# Patient Record
Sex: MALE | Race: WHITE | NOT HISPANIC OR LATINO | URBAN - METROPOLITAN AREA
[De-identification: names, ages, dates, MRNs, and addresses within clinical notes are randomized per-mention and may not be internally consistent; named-entity substitution may affect disease eponyms.]

---

## 2017-10-30 ENCOUNTER — INPATIENT (INPATIENT)
Facility: HOSPITAL | Age: 47
LOS: 0 days | Discharge: HOME | DRG: 247 | End: 2017-10-31
Attending: INTERNAL MEDICINE | Admitting: INTERNAL MEDICINE
Payer: COMMERCIAL

## 2017-10-30 ENCOUNTER — TRANSCRIPTION ENCOUNTER (OUTPATIENT)
Age: 47
End: 2017-10-30

## 2017-10-30 VITALS
TEMPERATURE: 98 F | RESPIRATION RATE: 16 BRPM | DIASTOLIC BLOOD PRESSURE: 85 MMHG | HEART RATE: 88 BPM | OXYGEN SATURATION: 98 % | SYSTOLIC BLOOD PRESSURE: 128 MMHG

## 2017-10-30 DIAGNOSIS — I20.9 ANGINA PECTORIS, UNSPECIFIED: ICD-10-CM

## 2017-10-30 LAB
ALBUMIN SERPL ELPH-MCNC: 4.7 G/DL — SIGNIFICANT CHANGE UP (ref 3.3–5)
ALP SERPL-CCNC: 31 U/L — LOW (ref 40–120)
ALT FLD-CCNC: 39 U/L RC — SIGNIFICANT CHANGE UP (ref 10–45)
ANION GAP SERPL CALC-SCNC: 13 MMOL/L — SIGNIFICANT CHANGE UP (ref 5–17)
APTT BLD: 37.9 SEC — HIGH (ref 27.5–37.4)
AST SERPL-CCNC: 30 U/L — SIGNIFICANT CHANGE UP (ref 10–40)
BASOPHILS # BLD AUTO: 0.1 K/UL — SIGNIFICANT CHANGE UP (ref 0–0.2)
BASOPHILS NFR BLD AUTO: 1.5 % — SIGNIFICANT CHANGE UP (ref 0–2)
BILIRUB SERPL-MCNC: 0.7 MG/DL — SIGNIFICANT CHANGE UP (ref 0.2–1.2)
BUN SERPL-MCNC: 16 MG/DL — SIGNIFICANT CHANGE UP (ref 7–23)
CALCIUM SERPL-MCNC: 10.1 MG/DL — SIGNIFICANT CHANGE UP (ref 8.4–10.5)
CHLORIDE SERPL-SCNC: 102 MMOL/L — SIGNIFICANT CHANGE UP (ref 96–108)
CK MB CFR SERPL CALC: 2.4 NG/ML — SIGNIFICANT CHANGE UP (ref 0–6.7)
CK SERPL-CCNC: 142 U/L — SIGNIFICANT CHANGE UP (ref 30–200)
CO2 SERPL-SCNC: 28 MMOL/L — SIGNIFICANT CHANGE UP (ref 22–31)
CREAT SERPL-MCNC: 1.09 MG/DL — SIGNIFICANT CHANGE UP (ref 0.5–1.3)
EOSINOPHIL # BLD AUTO: 0.2 K/UL — SIGNIFICANT CHANGE UP (ref 0–0.5)
EOSINOPHIL NFR BLD AUTO: 2.7 % — SIGNIFICANT CHANGE UP (ref 0–6)
GLUCOSE BLDC GLUCOMTR-MCNC: 161 MG/DL — HIGH (ref 70–99)
GLUCOSE SERPL-MCNC: 113 MG/DL — HIGH (ref 70–99)
HCT VFR BLD CALC: 48.9 % — SIGNIFICANT CHANGE UP (ref 39–50)
HGB BLD-MCNC: 15.4 G/DL — SIGNIFICANT CHANGE UP (ref 13–17)
INR BLD: 1.12 RATIO — SIGNIFICANT CHANGE UP (ref 0.88–1.16)
LYMPHOCYTES # BLD AUTO: 2.9 K/UL — SIGNIFICANT CHANGE UP (ref 1–3.3)
LYMPHOCYTES # BLD AUTO: 36.4 % — SIGNIFICANT CHANGE UP (ref 13–44)
MCHC RBC-ENTMCNC: 29.8 PG — SIGNIFICANT CHANGE UP (ref 27–34)
MCHC RBC-ENTMCNC: 31.5 GM/DL — LOW (ref 32–36)
MCV RBC AUTO: 94.9 FL — SIGNIFICANT CHANGE UP (ref 80–100)
MONOCYTES # BLD AUTO: 0.7 K/UL — SIGNIFICANT CHANGE UP (ref 0–0.9)
MONOCYTES NFR BLD AUTO: 9.2 % — SIGNIFICANT CHANGE UP (ref 2–14)
NEUTROPHILS # BLD AUTO: 4 K/UL — SIGNIFICANT CHANGE UP (ref 1.8–7.4)
NEUTROPHILS NFR BLD AUTO: 50.2 % — SIGNIFICANT CHANGE UP (ref 43–77)
PLATELET # BLD AUTO: 234 K/UL — SIGNIFICANT CHANGE UP (ref 150–400)
POTASSIUM SERPL-MCNC: 3.9 MMOL/L — SIGNIFICANT CHANGE UP (ref 3.5–5.3)
POTASSIUM SERPL-SCNC: 3.9 MMOL/L — SIGNIFICANT CHANGE UP (ref 3.5–5.3)
PROT SERPL-MCNC: 8.2 G/DL — SIGNIFICANT CHANGE UP (ref 6–8.3)
PROTHROM AB SERPL-ACNC: 12.2 SEC — SIGNIFICANT CHANGE UP (ref 9.8–12.7)
RBC # BLD: 5.15 M/UL — SIGNIFICANT CHANGE UP (ref 4.2–5.8)
RBC # FLD: 12.2 % — SIGNIFICANT CHANGE UP (ref 10.3–14.5)
SODIUM SERPL-SCNC: 143 MMOL/L — SIGNIFICANT CHANGE UP (ref 135–145)
TROPONIN T SERPL-MCNC: <0.01 NG/ML — SIGNIFICANT CHANGE UP (ref 0–0.06)
WBC # BLD: 8 K/UL — SIGNIFICANT CHANGE UP (ref 3.8–10.5)
WBC # FLD AUTO: 8 K/UL — SIGNIFICANT CHANGE UP (ref 3.8–10.5)

## 2017-10-30 PROCEDURE — 92928 PRQ TCAT PLMT NTRAC ST 1 LES: CPT | Mod: LD,GC

## 2017-10-30 PROCEDURE — 71020: CPT | Mod: 26

## 2017-10-30 PROCEDURE — 93010 ELECTROCARDIOGRAM REPORT: CPT | Mod: 59

## 2017-10-30 PROCEDURE — 99152 MOD SED SAME PHYS/QHP 5/>YRS: CPT | Mod: GC

## 2017-10-30 PROCEDURE — 99285 EMERGENCY DEPT VISIT HI MDM: CPT | Mod: 25

## 2017-10-30 PROCEDURE — 93010 ELECTROCARDIOGRAM REPORT: CPT

## 2017-10-30 PROCEDURE — 93458 L HRT ARTERY/VENTRICLE ANGIO: CPT | Mod: 26,59,GC

## 2017-10-30 RX ORDER — ASPIRIN/CALCIUM CARB/MAGNESIUM 324 MG
81 TABLET ORAL DAILY
Qty: 0 | Refills: 0 | Status: DISCONTINUED | OUTPATIENT
Start: 2017-10-30 | End: 2017-10-31

## 2017-10-30 RX ORDER — GLUCAGON INJECTION, SOLUTION 0.5 MG/.1ML
1 INJECTION, SOLUTION SUBCUTANEOUS ONCE
Qty: 0 | Refills: 0 | Status: DISCONTINUED | OUTPATIENT
Start: 2017-10-30 | End: 2017-10-31

## 2017-10-30 RX ORDER — DEXTROSE 50 % IN WATER 50 %
25 SYRINGE (ML) INTRAVENOUS ONCE
Qty: 0 | Refills: 0 | Status: DISCONTINUED | OUTPATIENT
Start: 2017-10-30 | End: 2017-10-31

## 2017-10-30 RX ORDER — ZALEPLON 10 MG
5 CAPSULE ORAL ONCE
Qty: 0 | Refills: 0 | Status: DISCONTINUED | OUTPATIENT
Start: 2017-10-30 | End: 2017-10-30

## 2017-10-30 RX ORDER — ATORVASTATIN CALCIUM 80 MG/1
20 TABLET, FILM COATED ORAL AT BEDTIME
Qty: 0 | Refills: 0 | Status: DISCONTINUED | OUTPATIENT
Start: 2017-10-30 | End: 2017-10-31

## 2017-10-30 RX ORDER — DEXTROSE 50 % IN WATER 50 %
12.5 SYRINGE (ML) INTRAVENOUS ONCE
Qty: 0 | Refills: 0 | Status: DISCONTINUED | OUTPATIENT
Start: 2017-10-30 | End: 2017-10-31

## 2017-10-30 RX ORDER — FENOFIBRATE,MICRONIZED 130 MG
145 CAPSULE ORAL DAILY
Qty: 0 | Refills: 0 | Status: DISCONTINUED | OUTPATIENT
Start: 2017-10-30 | End: 2017-10-31

## 2017-10-30 RX ORDER — DEXTROSE 50 % IN WATER 50 %
1 SYRINGE (ML) INTRAVENOUS ONCE
Qty: 0 | Refills: 0 | Status: DISCONTINUED | OUTPATIENT
Start: 2017-10-30 | End: 2017-10-31

## 2017-10-30 RX ORDER — ATORVASTATIN CALCIUM 80 MG/1
1 TABLET, FILM COATED ORAL
Qty: 30 | Refills: 2 | OUTPATIENT
Start: 2017-10-30 | End: 2018-01-27

## 2017-10-30 RX ORDER — SODIUM CHLORIDE 9 MG/ML
1000 INJECTION, SOLUTION INTRAVENOUS
Qty: 0 | Refills: 0 | Status: DISCONTINUED | OUTPATIENT
Start: 2017-10-30 | End: 2017-10-31

## 2017-10-30 RX ORDER — ASPIRIN/CALCIUM CARB/MAGNESIUM 324 MG
1 TABLET ORAL
Qty: 0 | Refills: 0 | COMMUNITY

## 2017-10-30 RX ORDER — CLOPIDOGREL BISULFATE 75 MG/1
75 TABLET, FILM COATED ORAL DAILY
Qty: 0 | Refills: 0 | Status: DISCONTINUED | OUTPATIENT
Start: 2017-10-31 | End: 2017-10-31

## 2017-10-30 RX ORDER — METFORMIN HYDROCHLORIDE 850 MG/1
1 TABLET ORAL
Qty: 0 | Refills: 0 | COMMUNITY

## 2017-10-30 RX ORDER — INSULIN LISPRO 100/ML
VIAL (ML) SUBCUTANEOUS
Qty: 0 | Refills: 0 | Status: DISCONTINUED | OUTPATIENT
Start: 2017-10-30 | End: 2017-10-31

## 2017-10-30 RX ORDER — INSULIN LISPRO 100/ML
VIAL (ML) SUBCUTANEOUS AT BEDTIME
Qty: 0 | Refills: 0 | Status: DISCONTINUED | OUTPATIENT
Start: 2017-10-30 | End: 2017-10-31

## 2017-10-30 RX ORDER — SODIUM CHLORIDE 9 MG/ML
3 INJECTION INTRAMUSCULAR; INTRAVENOUS; SUBCUTANEOUS ONCE
Qty: 0 | Refills: 0 | Status: COMPLETED | OUTPATIENT
Start: 2017-10-30 | End: 2017-10-30

## 2017-10-30 RX ADMIN — SODIUM CHLORIDE 3 MILLILITER(S): 9 INJECTION INTRAMUSCULAR; INTRAVENOUS; SUBCUTANEOUS at 17:42

## 2017-10-30 RX ADMIN — Medication 5 MILLIGRAM(S): at 23:40

## 2017-10-30 NOTE — DISCHARGE NOTE ADULT - PATIENT PORTAL LINK FT
“You can access the [facility] Patient Portal, offered by [facility], by registering with the following website: [email or web address]”

## 2017-10-30 NOTE — DISCHARGE NOTE ADULT - MEDICATION SUMMARY - MEDICATIONS TO TAKE
I will START or STAY ON the medications listed below when I get home from the hospital:    magnesium  -- 1 tab(s) by mouth once a day  -- Indication: For Supplement     Aspir 81 oral delayed release tablet  -- 1 tab(s) by mouth once a day  -- Indication: For To keep stent patent     metFORMIN 500 mg oral tablet  -- 1 tab(s) by mouth 2 times a day. RESTART ON 11/02/2017   -- Indication: For diabetes mellitus     atorvastatin 20 mg oral tablet  -- 1 tab(s) by mouth once a day  -- Indication: For hyperlipidemia     fenofibrate 160 mg oral tablet  -- 1 tab(s) by mouth once a day  -- Indication: For hyperlipidemia     clopidogrel 75 mg oral tablet  -- 1 tab(s) by mouth once a day  -- Indication: For To keep stent patent     Zinc  -- 1 tab(s) by mouth once a day  -- Indication: For supplement     Fish Oil 1200 mg oral capsule  -- 1 cap(s) by mouth once a day  -- Indication: For supplement     Multiple Vitamins oral tablet  -- 1 tab(s) by mouth once a day  -- Indication: For supplement     Vitamin D3  -- 3000 international unit(s) by mouth once a day  -- Indication: For supplement

## 2017-10-30 NOTE — DISCHARGE NOTE ADULT - CARE PLAN
Principal Discharge DX:	CAD (coronary artery disease)  Goal:	Pt remains chest pain free and understands post cath discharge instructions  Instructions for follow-up, activity and diet:	No heavy lifting or pushing/pulling with procedure arm for 2 weeks. No driving for 2 days. You may shower 24 hours following the procedure but avoid baths/swimming for 1 week. Check your wrist site for bleeding and/or swelling daily following procedure and call your doctor immediately if it occurs or if you experience increased pain at the site. Follow up with your cardiologist in 1-2 weeks. You may call [facility] if you have any questions/concerns regarding your procedure [phone].  Secondary Diagnosis:	Hyperlipidemia  Goal:	Your LDL cholesterol will be less than 70mg/dL  Instructions for follow-up, activity and diet:	Continue with your cholesterol medications. Eat a heart healthy diet that is low in saturated fats and salt, and includes whole grains, fruits, vegetables and lean protein; exercise regularly (consult with your physician or cardiologist first); maintain a heart healthy weight. Continue to follow with your primary physician or cardiologist for treatment goals, continue medication, have liver function testing every 3 months as anti lipid medications can cause liver irritation. If you smoke - quit (A resource to help you stop smoking is the [facility] – phone number [phone].).

## 2017-10-30 NOTE — ED ADULT NURSE NOTE - CHPI ED SYMPTOMS NEG
no chills/no fever/no vomiting/no nausea/no dizziness/no diaphoresis/no chest pain/no cough/no back pain/no syncope

## 2017-10-30 NOTE — CHART NOTE - NSCHARTNOTEFT_GEN_A_CORE
Removal of Radial Band  Pulses in the right upper extremity are palpable. The patient was placed in the supine position. The insertion site was identified and the band deflated per protocol. The radial band was removed slowly. Direct pressure was applied for 5  minutes.  VSS. No hematoma, bleeding or numbness and tingling.

## 2017-10-30 NOTE — ED PROVIDER NOTE - MEDICAL DECISION MAKING DETAILS
47 year old male with burning chest pain and abnormal nuclear stress test today. Sent to hospital for cardiac cath. Plan: admit.

## 2017-10-30 NOTE — DISCHARGE NOTE ADULT - CARE PROVIDER_API CALL
Francisco Silveira), Cardiovascular Disease; Internal Medicine  [address] [place] [address]  Phone: [phone]  Fax: [phone]

## 2017-10-30 NOTE — ED PROVIDER NOTE - OBJECTIVE STATEMENT
47 year old male with pmhx of type 2 diabetes presents with chest pain with exertion radiating to b/l UE and is sent to ER by cardiologist for cardiac cath. 5 weeks ago patient had an UTI, viral. Patient has been trying to vigorously work out and stopped working out for about 2 weeks because he had trouble breathing. Cardiologist concerned for pericarditis. Went to urgent care and had EKG done saw MD depressions. Had echo done and abnormal nuclear stress test done today. Non smoker. Fhx of cardiac issues.  Cardiologist- Dr. Francisco Silveira

## 2017-10-30 NOTE — DISCHARGE NOTE ADULT - HOSPITAL COURSE
47 year old male with pmhx of type 2 diabetes presents with chest pain with exertion radiating to b/l UE and is sent to ER by cardiologist for cardiac cath. 5 weeks ago patient had an UTI, viral. Patient has been trying to vigorously work out and stopped working out for about 2 weeks because he had trouble breathing. Cardiologist concerned for pericarditis. Went to urgent care and had EKG done saw AR depressions. Had echo done and abnormal nuclear stress test done today. Pt is now s/p cardiac cath GRISELDA x 1 pLAD. Pt tolerated the procedure well. Cardiac cath site benign. Discharge pending

## 2017-10-30 NOTE — DISCHARGE NOTE ADULT - PLAN OF CARE
Pt remains chest pain free and understands post cath discharge instructions No heavy lifting or pushing/pulling with procedure arm for 2 weeks. No driving for 2 days. You may shower 24 hours following the procedure but avoid baths/swimming for 1 week. Check your wrist site for bleeding and/or swelling daily following procedure and call your doctor immediately if it occurs or if you experience increased pain at the site. Follow up with your cardiologist in 1-2 weeks. You may call [facility] if you have any questions/concerns regarding your procedure [phone]. Your LDL cholesterol will be less than 70mg/dL Continue with your cholesterol medications. Eat a heart healthy diet that is low in saturated fats and salt, and includes whole grains, fruits, vegetables and lean protein; exercise regularly (consult with your physician or cardiologist first); maintain a heart healthy weight. Continue to follow with your primary physician or cardiologist for treatment goals, continue medication, have liver function testing every 3 months as anti lipid medications can cause liver irritation. If you smoke - quit (A resource to help you stop smoking is the [facility] – phone number [phone].).

## 2017-10-30 NOTE — DISCHARGE NOTE ADULT - ADDITIONAL INSTRUCTIONS
Follow-up with your Cardiologist in 1-2 week. DO NOT STOP TAKING ASPIRIN AND PLAVIX BEFORE SPEAKING WITH YOUR CARDIOLOGIST.

## 2017-10-30 NOTE — ED ADULT NURSE NOTE - OBJECTIVE STATEMENT
46 yo M with pmhx of DM2 and hyperlipidemia sent from cardiologist s/p positive stress test. C/o increasing ABEBE over the past week. No current CP/SOB. VSS. NSR on cardiac monitor. 18g IV in Left arm.   Lungs cta, Abd soft, nt, nd. Afebrile.   Denies fevers/chills.

## 2017-10-31 VITALS
OXYGEN SATURATION: 98 % | HEART RATE: 68 BPM | RESPIRATION RATE: 18 BRPM | SYSTOLIC BLOOD PRESSURE: 134 MMHG | DIASTOLIC BLOOD PRESSURE: 72 MMHG

## 2017-10-31 LAB
ANION GAP SERPL CALC-SCNC: 14 MMOL/L — SIGNIFICANT CHANGE UP (ref 5–17)
BUN SERPL-MCNC: 17 MG/DL — SIGNIFICANT CHANGE UP (ref 7–23)
CALCIUM SERPL-MCNC: 9.3 MG/DL — SIGNIFICANT CHANGE UP (ref 8.4–10.5)
CHLORIDE SERPL-SCNC: 102 MMOL/L — SIGNIFICANT CHANGE UP (ref 96–108)
CO2 SERPL-SCNC: 24 MMOL/L — SIGNIFICANT CHANGE UP (ref 22–31)
CREAT SERPL-MCNC: 1.06 MG/DL — SIGNIFICANT CHANGE UP (ref 0.5–1.3)
GLUCOSE SERPL-MCNC: 174 MG/DL — HIGH (ref 70–99)
HBA1C BLD-MCNC: 6.6 % — HIGH (ref 4–5.6)
HCT VFR BLD CALC: 40.5 % — SIGNIFICANT CHANGE UP (ref 39–50)
HGB BLD-MCNC: 14.1 G/DL — SIGNIFICANT CHANGE UP (ref 13–17)
MCHC RBC-ENTMCNC: 32.7 PG — SIGNIFICANT CHANGE UP (ref 27–34)
MCHC RBC-ENTMCNC: 34.9 GM/DL — SIGNIFICANT CHANGE UP (ref 32–36)
MCV RBC AUTO: 93.6 FL — SIGNIFICANT CHANGE UP (ref 80–100)
PLATELET # BLD AUTO: 209 K/UL — SIGNIFICANT CHANGE UP (ref 150–400)
POTASSIUM SERPL-MCNC: 3.8 MMOL/L — SIGNIFICANT CHANGE UP (ref 3.5–5.3)
POTASSIUM SERPL-SCNC: 3.8 MMOL/L — SIGNIFICANT CHANGE UP (ref 3.5–5.3)
RBC # BLD: 4.33 M/UL — SIGNIFICANT CHANGE UP (ref 4.2–5.8)
RBC # FLD: 12.1 % — SIGNIFICANT CHANGE UP (ref 10.3–14.5)
SODIUM SERPL-SCNC: 140 MMOL/L — SIGNIFICANT CHANGE UP (ref 135–145)
WBC # BLD: 7.8 K/UL — SIGNIFICANT CHANGE UP (ref 3.8–10.5)
WBC # FLD AUTO: 7.8 K/UL — SIGNIFICANT CHANGE UP (ref 3.8–10.5)

## 2017-10-31 PROCEDURE — 85027 COMPLETE CBC AUTOMATED: CPT

## 2017-10-31 PROCEDURE — 84484 ASSAY OF TROPONIN QUANT: CPT

## 2017-10-31 PROCEDURE — 80048 BASIC METABOLIC PNL TOTAL CA: CPT

## 2017-10-31 PROCEDURE — C1874: CPT

## 2017-10-31 PROCEDURE — C1887: CPT

## 2017-10-31 PROCEDURE — 80053 COMPREHEN METABOLIC PANEL: CPT

## 2017-10-31 PROCEDURE — 93010 ELECTROCARDIOGRAM REPORT: CPT

## 2017-10-31 PROCEDURE — 93005 ELECTROCARDIOGRAM TRACING: CPT

## 2017-10-31 PROCEDURE — C1894: CPT

## 2017-10-31 PROCEDURE — 85730 THROMBOPLASTIN TIME PARTIAL: CPT

## 2017-10-31 PROCEDURE — 71046 X-RAY EXAM CHEST 2 VIEWS: CPT

## 2017-10-31 PROCEDURE — 83036 HEMOGLOBIN GLYCOSYLATED A1C: CPT

## 2017-10-31 PROCEDURE — 82962 GLUCOSE BLOOD TEST: CPT

## 2017-10-31 PROCEDURE — 82553 CREATINE MB FRACTION: CPT

## 2017-10-31 PROCEDURE — 85610 PROTHROMBIN TIME: CPT

## 2017-10-31 PROCEDURE — 99153 MOD SED SAME PHYS/QHP EA: CPT

## 2017-10-31 PROCEDURE — C1725: CPT

## 2017-10-31 PROCEDURE — 99285 EMERGENCY DEPT VISIT HI MDM: CPT | Mod: 25

## 2017-10-31 PROCEDURE — C9600: CPT | Mod: LD

## 2017-10-31 PROCEDURE — 93458 L HRT ARTERY/VENTRICLE ANGIO: CPT | Mod: 59

## 2017-10-31 PROCEDURE — C1769: CPT

## 2017-10-31 PROCEDURE — 99152 MOD SED SAME PHYS/QHP 5/>YRS: CPT

## 2017-10-31 PROCEDURE — 82550 ASSAY OF CK (CPK): CPT

## 2017-10-31 RX ADMIN — Medication 145 MILLIGRAM(S): at 05:26

## 2017-10-31 RX ADMIN — CLOPIDOGREL BISULFATE 75 MILLIGRAM(S): 75 TABLET, FILM COATED ORAL at 05:26

## 2017-10-31 RX ADMIN — Medication 81 MILLIGRAM(S): at 06:31

## 2017-10-31 NOTE — PROGRESS NOTE ADULT - SUBJECTIVE AND OBJECTIVE BOX
47y old  Male who presents with a chief complaint of Chest pain (30 Oct 2017 21:02) now s/p cardiac cath GRISELDA x 1 pLAD via right radial.       Allergies    No Known Allergies    Intolerances      Medications:  aspirin enteric coated 81 milliGRAM(s) Oral daily  atorvastatin 20 milliGRAM(s) Oral at bedtime  clopidogrel Tablet 75 milliGRAM(s) Oral daily  dextrose 5%. 1000 milliLiter(s) IV Continuous <Continuous>  dextrose 50% Injectable 12.5 Gram(s) IV Push once  dextrose 50% Injectable 25 Gram(s) IV Push once  dextrose 50% Injectable 25 Gram(s) IV Push once  dextrose Gel 1 Dose(s) Oral once PRN  fenofibrate Tablet 145 milliGRAM(s) Oral daily  glucagon  Injectable 1 milliGRAM(s) IntraMuscular once PRN  insulin lispro (HumaLOG) corrective regimen sliding scale   SubCutaneous three times a day before meals  insulin lispro (HumaLOG) corrective regimen sliding scale   SubCutaneous at bedtime      Vitals:  T(C): 36.5 (10-30-17 @ 23:50), Max: 36.9 (10-30-17 @ 21:50)  HR: 70 (10-31-17 @ 01:50) (65 - 88)  BP: 138/78 (10-31-17 @ 01:50) (112/93 - 145/73)  BP(mean): --  RR: 16 (10-31-17 @ 01:50) (16 - 18)  SpO2: 97% (10-31-17 @ 01:50) (95% - 98%)  Wt(kg): --  Daily     Daily   I&O's Summary    30 Oct 2017 07:01  -  31 Oct 2017 02:29  --------------------------------------------------------  IN: 180 mL / OUT: 400 mL / NET: -220 mL      Physical Exam:  Appearance: Normal  HENT: Normal oral mus[place]a, NC/AT  Cardiovascular: S1S2, RRR, No M/R/G, no JVD, No Lower extremity edema  Procedural Access Site: Right radial. No hematoma, Non-tender to palpation, 2+ pulse, No bruit, No Ecchymosis  Respiratory: Clear to auscultation bilaterally  Gastrointestinal: Soft, Non tender, Normal Bowel Sounds  Musculoskeletal: No clubbing, No joint deformity   Neurologic: Non-focal  Psychiatry: AAOx3, Mood & affect appropriate  Skin: No rashes, No ecchymoses, No cyanosis    10-31    140  |  102  |  17  ----------------------------<  174<H>  3.8   |  24  |  1.06    Ca    9.3      31 Oct 2017 00:09    TPro  8.2  /  Alb  4.7  /  TBili  0.7  /  DBili  x   /  AST  30  /  ALT  39  /  AlkPhos  31<L>  10-30    PT/INR - ( 30 Oct 2017 17:46 )   PT: 12.2 sec;   INR: 1.12 ratio         PTT - ( 30 Oct 2017 17:46 )  PTT:37.9 sec  CARDIAC MARKERS ( 30 Oct 2017 17:46 )  x     / <0.01 ng/mL / 142 U/L / x     / 2.4 ng/mL    ECG: NSR 75bpm.     Stress Testing:     Cath: cardiac cath GRISELDA x 1 pLAD via right radial.     Interpretation of Telemetry:    ASSESSMENT/PLAN   47y old  Male who presents with a chief complaint of Chest pain (30 Oct 2017 21:02) now s/p cardiac cath GRISELDA x 1 pLAD via right radial. Pt tolerated the procedure well. Cardiac cath site benign. Overnight uneventful. EKG and blood work reviewed. Post-procedure discharge instructions discussed and questions addressed.

## 2020-12-05 ENCOUNTER — INPATIENT (INPATIENT)
Facility: HOSPITAL | Age: 50
LOS: 8 days | Discharge: HOME | DRG: 871 | End: 2020-12-14
Attending: INTERNAL MEDICINE | Admitting: INTERNAL MEDICINE
Payer: COMMERCIAL

## 2020-12-05 VITALS
RESPIRATION RATE: 20 BRPM | DIASTOLIC BLOOD PRESSURE: 74 MMHG | SYSTOLIC BLOOD PRESSURE: 114 MMHG | TEMPERATURE: 99 F | HEART RATE: 100 BPM | HEIGHT: 71 IN | WEIGHT: 257.94 LBS | OXYGEN SATURATION: 88 %

## 2020-12-05 DIAGNOSIS — U07.1 COVID-19: ICD-10-CM

## 2020-12-05 LAB
ALBUMIN SERPL ELPH-MCNC: 3.5 G/DL — SIGNIFICANT CHANGE UP (ref 3.3–5)
ALBUMIN SERPL ELPH-MCNC: 3.7 G/DL — SIGNIFICANT CHANGE UP (ref 3.3–5)
ALP SERPL-CCNC: 43 U/L — SIGNIFICANT CHANGE UP (ref 40–120)
ALP SERPL-CCNC: 57 U/L — SIGNIFICANT CHANGE UP (ref 40–120)
ALT FLD-CCNC: 42 U/L — SIGNIFICANT CHANGE UP (ref 10–45)
ALT FLD-CCNC: 55 U/L — HIGH (ref 10–45)
ANION GAP SERPL CALC-SCNC: 14 MMOL/L — SIGNIFICANT CHANGE UP (ref 5–17)
ANION GAP SERPL CALC-SCNC: 15 MMOL/L — SIGNIFICANT CHANGE UP (ref 5–17)
AST SERPL-CCNC: 43 U/L — HIGH (ref 10–40)
AST SERPL-CCNC: 79 U/L — HIGH (ref 10–40)
BASE EXCESS BLDV CALC-SCNC: -0.7 MMOL/L — SIGNIFICANT CHANGE UP (ref -2–2)
BASE EXCESS BLDV CALC-SCNC: 1.5 MMOL/L — SIGNIFICANT CHANGE UP (ref -2–2)
BASOPHILS # BLD AUTO: 0.05 K/UL — SIGNIFICANT CHANGE UP (ref 0–0.2)
BASOPHILS NFR BLD AUTO: 0.5 % — SIGNIFICANT CHANGE UP (ref 0–2)
BILIRUB SERPL-MCNC: 0.5 MG/DL — SIGNIFICANT CHANGE UP (ref 0.2–1.2)
BILIRUB SERPL-MCNC: 0.5 MG/DL — SIGNIFICANT CHANGE UP (ref 0.2–1.2)
BUN SERPL-MCNC: 14 MG/DL — SIGNIFICANT CHANGE UP (ref 7–23)
BUN SERPL-MCNC: 14 MG/DL — SIGNIFICANT CHANGE UP (ref 7–23)
CA-I SERPL-SCNC: 1.12 MMOL/L — SIGNIFICANT CHANGE UP (ref 1.12–1.3)
CA-I SERPL-SCNC: 1.16 MMOL/L — SIGNIFICANT CHANGE UP (ref 1.12–1.3)
CALCIUM SERPL-MCNC: 10.1 MG/DL — SIGNIFICANT CHANGE UP (ref 8.4–10.5)
CALCIUM SERPL-MCNC: 9.4 MG/DL — SIGNIFICANT CHANGE UP (ref 8.4–10.5)
CHLORIDE BLDV-SCNC: 102 MMOL/L — SIGNIFICANT CHANGE UP (ref 96–108)
CHLORIDE BLDV-SCNC: 104 MMOL/L — SIGNIFICANT CHANGE UP (ref 96–108)
CHLORIDE SERPL-SCNC: 98 MMOL/L — SIGNIFICANT CHANGE UP (ref 96–108)
CHLORIDE SERPL-SCNC: 98 MMOL/L — SIGNIFICANT CHANGE UP (ref 96–108)
CO2 BLDV-SCNC: 25 MMOL/L — SIGNIFICANT CHANGE UP (ref 22–30)
CO2 BLDV-SCNC: 28 MMOL/L — SIGNIFICANT CHANGE UP (ref 22–30)
CO2 SERPL-SCNC: 20 MMOL/L — LOW (ref 22–31)
CO2 SERPL-SCNC: 22 MMOL/L — SIGNIFICANT CHANGE UP (ref 22–31)
CREAT SERPL-MCNC: 0.96 MG/DL — SIGNIFICANT CHANGE UP (ref 0.5–1.3)
CREAT SERPL-MCNC: 0.97 MG/DL — SIGNIFICANT CHANGE UP (ref 0.5–1.3)
CRP SERPL-MCNC: 32.97 MG/DL — HIGH (ref 0–0.4)
EOSINOPHIL # BLD AUTO: 0.04 K/UL — SIGNIFICANT CHANGE UP (ref 0–0.5)
EOSINOPHIL NFR BLD AUTO: 0.4 % — SIGNIFICANT CHANGE UP (ref 0–6)
GAS PNL BLDV: 130 MMOL/L — LOW (ref 135–145)
GAS PNL BLDV: 131 MMOL/L — LOW (ref 135–145)
GAS PNL BLDV: SIGNIFICANT CHANGE UP
GLUCOSE BLDC GLUCOMTR-MCNC: 279 MG/DL — HIGH (ref 70–99)
GLUCOSE BLDV-MCNC: 224 MG/DL — HIGH (ref 70–99)
GLUCOSE BLDV-MCNC: 256 MG/DL — HIGH (ref 70–99)
GLUCOSE SERPL-MCNC: 223 MG/DL — HIGH (ref 70–99)
GLUCOSE SERPL-MCNC: 233 MG/DL — HIGH (ref 70–99)
HCO3 BLDV-SCNC: 24 MMOL/L — SIGNIFICANT CHANGE UP (ref 21–29)
HCO3 BLDV-SCNC: 27 MMOL/L — SIGNIFICANT CHANGE UP (ref 21–29)
HCT VFR BLD CALC: 44 % — SIGNIFICANT CHANGE UP (ref 39–50)
HCT VFR BLDA CALC: 44 % — SIGNIFICANT CHANGE UP (ref 39–50)
HCT VFR BLDA CALC: 47 % — SIGNIFICANT CHANGE UP (ref 39–50)
HGB BLD CALC-MCNC: 14.4 G/DL — SIGNIFICANT CHANGE UP (ref 13–17)
HGB BLD CALC-MCNC: 15.3 G/DL — SIGNIFICANT CHANGE UP (ref 13–17)
HGB BLD-MCNC: 15.1 G/DL — SIGNIFICANT CHANGE UP (ref 13–17)
IMM GRANULOCYTES NFR BLD AUTO: 1.2 % — SIGNIFICANT CHANGE UP (ref 0–1.5)
LACTATE BLDV-MCNC: 2.3 MMOL/L — HIGH (ref 0.7–2)
LACTATE BLDV-MCNC: 3.1 MMOL/L — HIGH (ref 0.7–2)
LYMPHOCYTES # BLD AUTO: 0.92 K/UL — LOW (ref 1–3.3)
LYMPHOCYTES # BLD AUTO: 8.5 % — LOW (ref 13–44)
MCHC RBC-ENTMCNC: 30.9 PG — SIGNIFICANT CHANGE UP (ref 27–34)
MCHC RBC-ENTMCNC: 34.3 GM/DL — SIGNIFICANT CHANGE UP (ref 32–36)
MCV RBC AUTO: 90 FL — SIGNIFICANT CHANGE UP (ref 80–100)
MONOCYTES # BLD AUTO: 0.61 K/UL — SIGNIFICANT CHANGE UP (ref 0–0.9)
MONOCYTES NFR BLD AUTO: 5.6 % — SIGNIFICANT CHANGE UP (ref 2–14)
NEUTROPHILS # BLD AUTO: 9.07 K/UL — HIGH (ref 1.8–7.4)
NEUTROPHILS NFR BLD AUTO: 83.8 % — HIGH (ref 43–77)
NRBC # BLD: 0 /100 WBCS — SIGNIFICANT CHANGE UP (ref 0–0)
PCO2 BLDV: 40 MMHG — SIGNIFICANT CHANGE UP (ref 35–50)
PCO2 BLDV: 46 MMHG — SIGNIFICANT CHANGE UP (ref 35–50)
PH BLDV: 7.38 — SIGNIFICANT CHANGE UP (ref 7.35–7.45)
PH BLDV: 7.39 — SIGNIFICANT CHANGE UP (ref 7.35–7.45)
PLATELET # BLD AUTO: 264 K/UL — SIGNIFICANT CHANGE UP (ref 150–400)
PO2 BLDV: 39 MMHG — SIGNIFICANT CHANGE UP (ref 25–45)
PO2 BLDV: 50 MMHG — HIGH (ref 25–45)
POTASSIUM BLDV-SCNC: 3.8 MMOL/L — SIGNIFICANT CHANGE UP (ref 3.5–5.3)
POTASSIUM BLDV-SCNC: 6.5 MMOL/L — CRITICAL HIGH (ref 3.5–5.3)
POTASSIUM SERPL-MCNC: 4.4 MMOL/L — SIGNIFICANT CHANGE UP (ref 3.5–5.3)
POTASSIUM SERPL-MCNC: 5.8 MMOL/L — HIGH (ref 3.5–5.3)
POTASSIUM SERPL-SCNC: 4.4 MMOL/L — SIGNIFICANT CHANGE UP (ref 3.5–5.3)
POTASSIUM SERPL-SCNC: 5.8 MMOL/L — HIGH (ref 3.5–5.3)
PROCALCITONIN SERPL-MCNC: 0.39 NG/ML — HIGH (ref 0.02–0.1)
PROT SERPL-MCNC: 6.9 G/DL — SIGNIFICANT CHANGE UP (ref 6–8.3)
PROT SERPL-MCNC: 8.4 G/DL — HIGH (ref 6–8.3)
RBC # BLD: 4.89 M/UL — SIGNIFICANT CHANGE UP (ref 4.2–5.8)
RBC # FLD: 11.9 % — SIGNIFICANT CHANGE UP (ref 10.3–14.5)
SAO2 % BLDV: 64 % — LOW (ref 67–88)
SAO2 % BLDV: 84 % — SIGNIFICANT CHANGE UP (ref 67–88)
SARS-COV-2 RNA SPEC QL NAA+PROBE: DETECTED
SODIUM SERPL-SCNC: 133 MMOL/L — LOW (ref 135–145)
SODIUM SERPL-SCNC: 134 MMOL/L — LOW (ref 135–145)
WBC # BLD: 10.82 K/UL — HIGH (ref 3.8–10.5)
WBC # FLD AUTO: 10.82 K/UL — HIGH (ref 3.8–10.5)

## 2020-12-05 PROCEDURE — 71045 X-RAY EXAM CHEST 1 VIEW: CPT | Mod: 26

## 2020-12-05 PROCEDURE — 99285 EMERGENCY DEPT VISIT HI MDM: CPT

## 2020-12-05 PROCEDURE — 93308 TTE F-UP OR LMTD: CPT | Mod: 26

## 2020-12-05 RX ORDER — DEXTROSE 50 % IN WATER 50 %
15 SYRINGE (ML) INTRAVENOUS ONCE
Refills: 0 | Status: DISCONTINUED | OUTPATIENT
Start: 2020-12-05 | End: 2020-12-14

## 2020-12-05 RX ORDER — SODIUM CHLORIDE 9 MG/ML
1000 INJECTION, SOLUTION INTRAVENOUS
Refills: 0 | Status: DISCONTINUED | OUTPATIENT
Start: 2020-12-05 | End: 2020-12-14

## 2020-12-05 RX ORDER — DEXAMETHASONE 0.5 MG/5ML
6 ELIXIR ORAL DAILY
Refills: 0 | Status: COMPLETED | OUTPATIENT
Start: 2020-12-05 | End: 2020-12-14

## 2020-12-05 RX ORDER — DEXAMETHASONE 0.5 MG/5ML
6 ELIXIR ORAL ONCE
Refills: 0 | Status: COMPLETED | OUTPATIENT
Start: 2020-12-05 | End: 2020-12-05

## 2020-12-05 RX ORDER — INSULIN LISPRO 100/ML
VIAL (ML) SUBCUTANEOUS AT BEDTIME
Refills: 0 | Status: DISCONTINUED | OUTPATIENT
Start: 2020-12-05 | End: 2020-12-14

## 2020-12-05 RX ORDER — CHOLECALCIFEROL (VITAMIN D3) 125 MCG
3000 CAPSULE ORAL
Qty: 0 | Refills: 0 | DISCHARGE

## 2020-12-05 RX ORDER — ASPIRIN/CALCIUM CARB/MAGNESIUM 324 MG
81 TABLET ORAL DAILY
Refills: 0 | Status: DISCONTINUED | OUTPATIENT
Start: 2020-12-05 | End: 2020-12-14

## 2020-12-05 RX ORDER — ONDANSETRON 8 MG/1
4 TABLET, FILM COATED ORAL ONCE
Refills: 0 | Status: COMPLETED | OUTPATIENT
Start: 2020-12-05 | End: 2020-12-05

## 2020-12-05 RX ORDER — SODIUM CHLORIDE 9 MG/ML
1000 INJECTION, SOLUTION INTRAVENOUS ONCE
Refills: 0 | Status: COMPLETED | OUTPATIENT
Start: 2020-12-05 | End: 2020-12-05

## 2020-12-05 RX ORDER — SODIUM CHLORIDE 9 MG/ML
1000 INJECTION INTRAMUSCULAR; INTRAVENOUS; SUBCUTANEOUS
Refills: 0 | Status: DISCONTINUED | OUTPATIENT
Start: 2020-12-05 | End: 2020-12-06

## 2020-12-05 RX ORDER — CLOPIDOGREL BISULFATE 75 MG/1
75 TABLET, FILM COATED ORAL DAILY
Refills: 0 | Status: DISCONTINUED | OUTPATIENT
Start: 2020-12-05 | End: 2020-12-14

## 2020-12-05 RX ORDER — ACETAMINOPHEN 500 MG
650 TABLET ORAL EVERY 4 HOURS
Refills: 0 | Status: DISCONTINUED | OUTPATIENT
Start: 2020-12-05 | End: 2020-12-14

## 2020-12-05 RX ORDER — GLUCAGON INJECTION, SOLUTION 0.5 MG/.1ML
1 INJECTION, SOLUTION SUBCUTANEOUS ONCE
Refills: 0 | Status: DISCONTINUED | OUTPATIENT
Start: 2020-12-05 | End: 2020-12-14

## 2020-12-05 RX ORDER — INSULIN LISPRO 100/ML
VIAL (ML) SUBCUTANEOUS
Refills: 0 | Status: DISCONTINUED | OUTPATIENT
Start: 2020-12-05 | End: 2020-12-06

## 2020-12-05 RX ORDER — REMDESIVIR 5 MG/ML
100 INJECTION INTRAVENOUS EVERY 24 HOURS
Refills: 0 | Status: COMPLETED | OUTPATIENT
Start: 2020-12-06 | End: 2020-12-09

## 2020-12-05 RX ORDER — REMDESIVIR 5 MG/ML
INJECTION INTRAVENOUS
Refills: 0 | Status: COMPLETED | OUTPATIENT
Start: 2020-12-05 | End: 2020-12-09

## 2020-12-05 RX ORDER — DEXTROSE 50 % IN WATER 50 %
12.5 SYRINGE (ML) INTRAVENOUS ONCE
Refills: 0 | Status: DISCONTINUED | OUTPATIENT
Start: 2020-12-05 | End: 2020-12-14

## 2020-12-05 RX ORDER — ENOXAPARIN SODIUM 100 MG/ML
40 INJECTION SUBCUTANEOUS EVERY 12 HOURS
Refills: 0 | Status: DISCONTINUED | OUTPATIENT
Start: 2020-12-05 | End: 2020-12-14

## 2020-12-05 RX ORDER — DEXTROSE 50 % IN WATER 50 %
25 SYRINGE (ML) INTRAVENOUS ONCE
Refills: 0 | Status: DISCONTINUED | OUTPATIENT
Start: 2020-12-05 | End: 2020-12-14

## 2020-12-05 RX ORDER — REMDESIVIR 5 MG/ML
200 INJECTION INTRAVENOUS EVERY 24 HOURS
Refills: 0 | Status: COMPLETED | OUTPATIENT
Start: 2020-12-05 | End: 2020-12-05

## 2020-12-05 RX ADMIN — Medication 81 MILLIGRAM(S): at 22:06

## 2020-12-05 RX ADMIN — Medication 6 MILLIGRAM(S): at 18:05

## 2020-12-05 RX ADMIN — ENOXAPARIN SODIUM 40 MILLIGRAM(S): 100 INJECTION SUBCUTANEOUS at 22:11

## 2020-12-05 RX ADMIN — CLOPIDOGREL BISULFATE 75 MILLIGRAM(S): 75 TABLET, FILM COATED ORAL at 22:07

## 2020-12-05 RX ADMIN — SODIUM CHLORIDE 1000 MILLILITER(S): 9 INJECTION, SOLUTION INTRAVENOUS at 18:41

## 2020-12-05 RX ADMIN — SODIUM CHLORIDE 1000 MILLILITER(S): 9 INJECTION, SOLUTION INTRAVENOUS at 19:36

## 2020-12-05 RX ADMIN — SODIUM CHLORIDE 100 MILLILITER(S): 9 INJECTION INTRAMUSCULAR; INTRAVENOUS; SUBCUTANEOUS at 22:05

## 2020-12-05 RX ADMIN — REMDESIVIR 500 MILLIGRAM(S): 5 INJECTION INTRAVENOUS at 22:06

## 2020-12-05 RX ADMIN — ONDANSETRON 4 MILLIGRAM(S): 8 TABLET, FILM COATED ORAL at 18:05

## 2020-12-05 NOTE — H&P ADULT - ASSESSMENT
49 yo M hx CAD s/p 1x stent, COVID+, c/o worsening SOB. Pt states he tested positive for COVID the day before Thanksgiving 10 days ago. This is also the time the pt's symptoms of myalgias, fatigue, cough, and fevers started, which have worsened over time and which is why he is here today. Pt has a pulse-oximeter at home. He notes his O2 sat to be typically 91-94%, and today was 85%. Pt took tylenol 2 houra menjivar.

## 2020-12-05 NOTE — ED PROVIDER NOTE - PROGRESS NOTE DETAILS
Attending Caroline Membreno: pt became more hypoxic. with oxygen to 70's. placed on NRB with imrpovement. will start hiflow

## 2020-12-05 NOTE — ED PROVIDER NOTE - ATTENDING CONTRIBUTION TO CARE
Attending MD Caroline Membreno:  I personally have seen and examined this patient.  Resident note reviewed and agree on plan of care and except where noted.  See HPI, PE, and MDM for details.

## 2020-12-05 NOTE — ED PROVIDER NOTE - DOMESTIC TRAVEL HIGH RISK QUESTION
We are committed to providing our patients with their test results in a timely manner. If you have access to [employer], you will receive your results within 5 to 7 days. If your results are normal, a member of our office may call you or you may receive a letter in the mail within 10 to 15 days of your testing. If your results have something additional to discuss, a member of our office will contact you by phone. If at any time you have questions related your results, please feel free to call our office at [phone]  
No

## 2020-12-05 NOTE — ED PROVIDER NOTE - OBJECTIVE STATEMENT
49 yo M hx CAD s/p 1x stent, COVID+, c/o worsening SOB. Pt states he tested positive for COVID the day before Thanksgiving 10 days ago. This is also the time the pt's symptoms of myalgias, fatigue, cough, and fevers started, which have worsened over time and which is why he is here today. Pt has a pulse-oximeter at home. He notes his O2 sat to be typically 91-94%, and today was 85%. Pt took tylenol 2 houra menjivar.     PCP: Dr Pritesh Silveira    MedHx: CAD, pre-diabetic, HLD  Meds: metformin, statin, plavix  NKDA

## 2020-12-05 NOTE — ED PROVIDER NOTE - PHYSICAL EXAMINATION
Gen: non-toxic appearing  Head: NCAT  Eyes: EOMI, PERRLA, no conjunctival pallor, no scleral icterus  ENT: mucous membranes moist, no discharge  Neck: neck supple  Resp: sating 94% on 4L NC, lungs CTAB  CV: RRR, +S1/S2, no M/R/G  GI: Abdomen soft non-distended, NTTP, no masses  MSK: No open wounds, no bruising, no lower extremity edema  Neuro: A&Ox4, sensation nl, motor 5/5 RUE/LUE/RLE/LLE, follows commands  Ext: no edema, no deformity, warm and well-perfused  Skin: no rash or bruising Gen: non-toxic appearing  Head: NCAT  Eyes: EOMI, PERRLA, no conjunctival pallor, no scleral icterus  ENT: mucous membranes moist, no discharge  Neck: neck supple  Resp: sating 94% on 4L NC, lungs CTAB  CV: RRR, +S1/S2, no M/R/G  GI: Abdomen soft non-distended, NTTP, no masses  MSK: No open wounds, no bruising, no lower extremity edema  Neuro: A&Ox4, sensation nl, motor 5/5 RUE/LUE/RLE/LLE, follows commands  Ext: no edema, no deformity, warm and well-perfused  Skin: no rash or bruising  Attending Caroline Membreno: Gen: NAD, heent: atrauamtic, eomi, perrla, mmm, op pink, uvula midline, neck; nttp, no nuchal rigidity, chest: nttp, no crepitus, cv: rrr, , lungs: ctab, abd: soft, nontender, nondistended, no peritoneal signs, +BS, no guarding, ext: wwp, neg homans, skin: no rash, neuro: awake and alert, following commands, speech clear, sensation and strength intact, no focal deficits

## 2020-12-05 NOTE — H&P ADULT - HISTORY OF PRESENT ILLNESS
51 yo M hx CAD s/p 1x stent, COVID+, c/o worsening SOB. Pt states he tested positive for COVID the day before Thanksgiving 10 days ago. This is also the time the pt's symptoms of myalgias, fatigue, cough, and fevers started, which have worsened over time and which is why he is here today. Pt has a pulse-oximeter at home. He notes his O2 sat to be typically 91-94%, and today was 85%. Pt took tylenol 2 houra menjivar.

## 2020-12-05 NOTE — ED ADULT NURSE NOTE - OBJECTIVE STATEMENT
49yo M aaox4 Hx cardiac stent (2017)ambulatory from home presents to ED c/o fever, sob and cough, pt states that he started feeling sick since 11/25/20, took covid and become positive, pt reports feeling sob, productive cough ( pinkish sputum), no taste and smell, body aches, today come for evaluation, pt presents w/ SPO2 80's placed in 4 Ltc\ O2, also c/o nausea., at this time Pt denies CP,, HA, vision changes, /v/d, , abdominal pain, Gu symptoms . No fever , pt reports took 2x500 mg  Tylenol PTA . Safety and comfort measures initiated- bed placed in lowest position and side rails raised. Pt oriented to call bell system.

## 2020-12-05 NOTE — ED PROVIDER NOTE - CLINICAL SUMMARY MEDICAL DECISION MAKING FREE TEXT BOX
Christine FUENTES PGY-1: 49 yo M COVID+, c/o worsening SOB and cough. Pt sating 84% on RA, improved to 94% on 4L NC. Will be admitted for hypoxemia. Labs, CXR. Will give decadron 6 mg per protocol. Christine FUENTES PGY-1: 49 yo M COVID+, c/o worsening SOB and cough. Pt sating 84% on RA, improved to 94% on 4L NC. Will be admitted for hypoxemia. Labs, CXR. Will give decadron 6 mg per protocol.  Attending Caroline Membreno: 51 y/o male diagonsed with covid around thanksgiving presenting with worsening sob and difficulty breathing. upon arrival pt hypoxic requiring supplemental oxygen. pocus shows diffuse anterior B lines. while in the ed pt became more hypoxic and placed on high flow with improvement. suspect symptoms likely from COVID infection. no limited pocus shows no visible DVT in b/l legs. limited RV views. will provide symptomatic treatment, decadron. pt tba.

## 2020-12-05 NOTE — ED PROVIDER NOTE - NS ED ROS FT
GENERAL: +fevers  EYES: No change in vision  HEENT: No trouble swallowing or speaking  CARDIAC: No chest pain  PULMONARY: +cough, +SOB  GI: +nausea, no abdominal pain, no diarrhea or constipation  : No changes in urination  SKIN: No rashes  NEURO: No headache, no numbness  MSK: No joint pain    Otherwise as HPI or negative.

## 2020-12-06 LAB
A1C WITH ESTIMATED AVERAGE GLUCOSE RESULT: 10.8 % — HIGH (ref 4–5.6)
ALBUMIN SERPL ELPH-MCNC: 3.6 G/DL — SIGNIFICANT CHANGE UP (ref 3.3–5)
ALP SERPL-CCNC: 46 U/L — SIGNIFICANT CHANGE UP (ref 40–120)
ALT FLD-CCNC: 44 U/L — SIGNIFICANT CHANGE UP (ref 10–45)
ANION GAP SERPL CALC-SCNC: 15 MMOL/L — SIGNIFICANT CHANGE UP (ref 5–17)
AST SERPL-CCNC: 38 U/L — SIGNIFICANT CHANGE UP (ref 10–40)
BILIRUB SERPL-MCNC: 0.4 MG/DL — SIGNIFICANT CHANGE UP (ref 0.2–1.2)
BUN SERPL-MCNC: 16 MG/DL — SIGNIFICANT CHANGE UP (ref 7–23)
CALCIUM SERPL-MCNC: 9.1 MG/DL — SIGNIFICANT CHANGE UP (ref 8.4–10.5)
CHLORIDE SERPL-SCNC: 99 MMOL/L — SIGNIFICANT CHANGE UP (ref 96–108)
CO2 SERPL-SCNC: 21 MMOL/L — LOW (ref 22–31)
CREAT SERPL-MCNC: 0.77 MG/DL — SIGNIFICANT CHANGE UP (ref 0.5–1.3)
D DIMER BLD IA.RAPID-MCNC: 202 NG/ML DDU — SIGNIFICANT CHANGE UP
ESTIMATED AVERAGE GLUCOSE: 263 MG/DL — HIGH (ref 68–114)
FERRITIN SERPL-MCNC: 890 NG/ML — HIGH (ref 30–400)
FERRITIN SERPL-MCNC: 898 NG/ML — HIGH (ref 30–400)
GLUCOSE BLDC GLUCOMTR-MCNC: 273 MG/DL — HIGH (ref 70–99)
GLUCOSE BLDC GLUCOMTR-MCNC: 282 MG/DL — HIGH (ref 70–99)
GLUCOSE BLDC GLUCOMTR-MCNC: 305 MG/DL — HIGH (ref 70–99)
GLUCOSE BLDC GLUCOMTR-MCNC: 312 MG/DL — HIGH (ref 70–99)
GLUCOSE SERPL-MCNC: 277 MG/DL — HIGH (ref 70–99)
HCT VFR BLD CALC: 38 % — LOW (ref 39–50)
HGB BLD-MCNC: 13.1 G/DL — SIGNIFICANT CHANGE UP (ref 13–17)
LDH SERPL L TO P-CCNC: 387 U/L — HIGH (ref 50–242)
MCHC RBC-ENTMCNC: 30.4 PG — SIGNIFICANT CHANGE UP (ref 27–34)
MCHC RBC-ENTMCNC: 34.5 GM/DL — SIGNIFICANT CHANGE UP (ref 32–36)
MCV RBC AUTO: 88.2 FL — SIGNIFICANT CHANGE UP (ref 80–100)
NRBC # BLD: 0 /100 WBCS — SIGNIFICANT CHANGE UP (ref 0–0)
PLATELET # BLD AUTO: 274 K/UL — SIGNIFICANT CHANGE UP (ref 150–400)
POTASSIUM SERPL-MCNC: 4.1 MMOL/L — SIGNIFICANT CHANGE UP (ref 3.5–5.3)
POTASSIUM SERPL-SCNC: 4.1 MMOL/L — SIGNIFICANT CHANGE UP (ref 3.5–5.3)
PROT SERPL-MCNC: 6.8 G/DL — SIGNIFICANT CHANGE UP (ref 6–8.3)
RBC # BLD: 4.31 M/UL — SIGNIFICANT CHANGE UP (ref 4.2–5.8)
RBC # FLD: 11.9 % — SIGNIFICANT CHANGE UP (ref 10.3–14.5)
SODIUM SERPL-SCNC: 135 MMOL/L — SIGNIFICANT CHANGE UP (ref 135–145)
WBC # BLD: 10.32 K/UL — SIGNIFICANT CHANGE UP (ref 3.8–10.5)
WBC # FLD AUTO: 10.32 K/UL — SIGNIFICANT CHANGE UP (ref 3.8–10.5)

## 2020-12-06 PROCEDURE — 99223 1ST HOSP IP/OBS HIGH 75: CPT

## 2020-12-06 RX ORDER — INSULIN GLARGINE 100 [IU]/ML
20 INJECTION, SOLUTION SUBCUTANEOUS AT BEDTIME
Refills: 0 | Status: DISCONTINUED | OUTPATIENT
Start: 2020-12-06 | End: 2020-12-07

## 2020-12-06 RX ORDER — INSULIN LISPRO 100/ML
VIAL (ML) SUBCUTANEOUS
Refills: 0 | Status: DISCONTINUED | OUTPATIENT
Start: 2020-12-06 | End: 2020-12-14

## 2020-12-06 RX ORDER — ALBUTEROL 90 UG/1
2 AEROSOL, METERED ORAL EVERY 6 HOURS
Refills: 0 | Status: DISCONTINUED | OUTPATIENT
Start: 2020-12-06 | End: 2020-12-14

## 2020-12-06 RX ORDER — SODIUM CHLORIDE 9 MG/ML
1000 INJECTION INTRAMUSCULAR; INTRAVENOUS; SUBCUTANEOUS
Refills: 0 | Status: DISCONTINUED | OUTPATIENT
Start: 2020-12-06 | End: 2020-12-07

## 2020-12-06 RX ORDER — INFLUENZA VIRUS VACCINE 15; 15; 15; 15 UG/.5ML; UG/.5ML; UG/.5ML; UG/.5ML
0.5 SUSPENSION INTRAMUSCULAR ONCE
Refills: 0 | Status: DISCONTINUED | OUTPATIENT
Start: 2020-12-06 | End: 2020-12-14

## 2020-12-06 RX ADMIN — Medication 6: at 11:47

## 2020-12-06 RX ADMIN — Medication 2: at 21:29

## 2020-12-06 RX ADMIN — ALBUTEROL 2 PUFF(S): 90 AEROSOL, METERED ORAL at 17:03

## 2020-12-06 RX ADMIN — Medication 6: at 08:14

## 2020-12-06 RX ADMIN — REMDESIVIR 500 MILLIGRAM(S): 5 INJECTION INTRAVENOUS at 21:37

## 2020-12-06 RX ADMIN — ALBUTEROL 2 PUFF(S): 90 AEROSOL, METERED ORAL at 23:10

## 2020-12-06 RX ADMIN — Medication 16: at 17:02

## 2020-12-06 RX ADMIN — SODIUM CHLORIDE 100 MILLILITER(S): 9 INJECTION INTRAMUSCULAR; INTRAVENOUS; SUBCUTANEOUS at 08:15

## 2020-12-06 RX ADMIN — ENOXAPARIN SODIUM 40 MILLIGRAM(S): 100 INJECTION SUBCUTANEOUS at 05:07

## 2020-12-06 RX ADMIN — Medication 100 MILLIGRAM(S): at 23:16

## 2020-12-06 RX ADMIN — Medication 81 MILLIGRAM(S): at 11:14

## 2020-12-06 RX ADMIN — ENOXAPARIN SODIUM 40 MILLIGRAM(S): 100 INJECTION SUBCUTANEOUS at 17:02

## 2020-12-06 RX ADMIN — Medication 1: at 00:26

## 2020-12-06 RX ADMIN — Medication 100 MILLIGRAM(S): at 13:00

## 2020-12-06 RX ADMIN — INSULIN GLARGINE 20 UNIT(S): 100 INJECTION, SOLUTION SUBCUTANEOUS at 21:36

## 2020-12-06 RX ADMIN — SODIUM CHLORIDE 50 MILLILITER(S): 9 INJECTION INTRAMUSCULAR; INTRAVENOUS; SUBCUTANEOUS at 12:59

## 2020-12-06 RX ADMIN — CLOPIDOGREL BISULFATE 75 MILLIGRAM(S): 75 TABLET, FILM COATED ORAL at 11:14

## 2020-12-06 RX ADMIN — Medication 6 MILLIGRAM(S): at 05:07

## 2020-12-06 NOTE — CONSULT NOTE ADULT - SUBJECTIVE AND OBJECTIVE BOX
Patient is a 50y old  Male who presents with a chief complaint of sob and covid positive (06 Dec 2020 10:36)      HPI:   49 yo M hx CAD s/p 1x stent, COVID+, c/o worsening SOB. Pt states he tested positive for COVID the day before Thanksgiving 10 days ago. This is also the time the pt's symptoms of myalgias, fatigue, cough, and fevers started, which have worsened over time and which is why he is here today. Pt has a pulse-oximeter at home. He notes his O2 sat to be typically 91-94%, and today was 85%. Pt took tylenol 2 houra menjivar.  (05 Dec 2020 19:44)    he has no underlying lung disease except OPSA:  he is not using any cpap at home: he is obese:     ?FOLLOWING PRESENT  [ x] Hx of PE/DVT, [x ] Hx COPD, [ x] Hx of Asthma, [x] Hx of Hospitalization, [ x  Hx of BiPAP/CPAP use, [x ] Hx of JUVENTINO    Allergies    No Known Allergies    Intolerances        PAST MEDICAL & SURGICAL HISTORY:  Type 2 diabetes mellitus without complication, without long-term current use of insulin    CAD (coronary artery disease)        FAMILY HISTORY:      Social History: [ x ] TOBACCO                  [ x ] ETOH                                 [  x] IVDA/DRUGS    REVIEW OF SYSTEMS      General:	x    Skin/Breast:x  	  Ophthalmologic:x  	  ENMT:	x    Respiratory and Thorax: cb, weakness fever  	  Cardiovascular:	x    Gastrointestinal:	x    Genitourinary:	x    Musculoskeletal:	x    Neurological:	x    Psychiatric:	x    Hematology/Lymphatics:	x    Endocrine:	xx    Allergic/Immunologic:	x    MEDICATIONS  (STANDING):  ALBUTerol    90 MICROgram(s) HFA Inhaler 2 Puff(s) Inhalation every 6 hours  aspirin enteric coated 81 milliGRAM(s) Oral daily  clopidogrel Tablet 75 milliGRAM(s) Oral daily  dexAMETHasone  Injectable 6 milliGRAM(s) IV Push daily  dextrose 40% Gel 15 Gram(s) Oral once  dextrose 5%. 1000 milliLiter(s) (50 mL/Hr) IV Continuous <Continuous>  dextrose 5%. 1000 milliLiter(s) (100 mL/Hr) IV Continuous <Continuous>  dextrose 50% Injectable 25 Gram(s) IV Push once  dextrose 50% Injectable 12.5 Gram(s) IV Push once  dextrose 50% Injectable 25 Gram(s) IV Push once  enoxaparin Injectable 40 milliGRAM(s) SubCutaneous every 12 hours  glucagon  Injectable 1 milliGRAM(s) IntraMuscular once  influenza   Vaccine 0.5 milliLiter(s) IntraMuscular once  insulin lispro (ADMELOG) corrective regimen sliding scale   SubCutaneous three times a day before meals  insulin lispro (ADMELOG) corrective regimen sliding scale   SubCutaneous at bedtime  remdesivir  IVPB   IV Intermittent   remdesivir  IVPB 100 milliGRAM(s) IV Intermittent every 24 hours  sodium chloride 0.9%. 1000 milliLiter(s) (50 mL/Hr) IV Continuous <Continuous>    MEDICATIONS  (PRN):  acetaminophen   Tablet .. 650 milliGRAM(s) Oral every 4 hours PRN Temp greater or equal to 38.5C (101.3F)  benzonatate 100 milliGRAM(s) Oral every 8 hours PRN Cough       Vital Signs Last 24 Hrs  T(C): 36.8 (06 Dec 2020 11:13), Max: 38.1 (06 Dec 2020 05:00)  T(F): 98.3 (06 Dec 2020 11:13), Max: 100.5 (06 Dec 2020 05:00)  HR: 88 (06 Dec 2020 11:13) (77 - 100)  BP: 132/77 (06 Dec 2020 11:13) (114/74 - 149/79)  BP(mean): 102 (05 Dec 2020 19:44) (102 - 102)  RR: 18 (06 Dec 2020 11:13) (18 - 24)  SpO2: 93% (06 Dec 2020 11:13) (88% - 100%)        I&O's Summary    05 Dec 2020 07:01  -  06 Dec 2020 07:00  --------------------------------------------------------  IN: 1300 mL / OUT: 0 mL / NET: 1300 mL        Physical Exam:   GENERAL: Obese+  HEENT: DAVON/   Atraumatic, Normocephalic  ENMT: No tonsillar erythema, exudates, or enlargement; Moist mucous membranes, Good dentition, No lesions  NECK: Supple, No JVD, Normal thyroid  CHEST/LUNG: Clear to auscultation bilaterally  CVS: Regular rate and rhythm; No murmurs, rubs, or gallops  GI: : Soft, Nontender, Nondistended; Bowel sounds present  NERVOUS SYSTEM:  Alert & Oriented X3  EXTREMITIES: -edema  LYMPH: No lymphadenopathy noted  SKIN: No rashes or lesions  ENDOCRINOLOGY: No Thyromegaly  PSYCH: Appropriate    Labs:  Venous<46<4>>39<<7.385>>Venous<<3><<4><<5<<399>>, Venous<40<4>>50<<7.395>>Venous<<3><<4><<5<<509>>                            13.1   10.32 )-----------( 274      ( 06 Dec 2020 08:52 )             38.0                         15.1   10.82 )-----------( 264      ( 05 Dec 2020 18:16 )             44.0     12-06    135  |  99  |  16  ----------------------------<  277<H>  4.1   |  21<L>  |  0.77  12-05    134<L>  |  98  |  14  ----------------------------<  223<H>  4.4   |  22  |  0.96  12-05    133<L>  |  98  |  14  ----------------------------<  233<H>  5.8<H>   |  20<L>  |  0.97    Ca    9.1      06 Dec 2020 08:51  Ca    9.4      05 Dec 2020 19:40  Ca    10.1      05 Dec 2020 18:16    TPro  6.8  /  Alb  3.6  /  TBili  0.4  /  DBili  x   /  AST  38  /  ALT  44  /  AlkPhos  46  12-06  TPro  6.9  /  Alb  3.5  /  TBili  0.5  /  DBili  x   /  AST  43<H>  /  ALT  42  /  AlkPhos  43  12-05  TPro  8.4<H>  /  Alb  3.7  /  TBili  0.5  /  DBili  x   /  AST  79<H>  /  ALT  55<H>  /  AlkPhos  57  12-05    CAPILLARY BLOOD GLUCOSE      POCT Blood Glucose.: 282 mg/dL (06 Dec 2020 11:40)  POCT Blood Glucose.: 273 mg/dL (06 Dec 2020 07:53)  POCT Blood Glucose.: 279 mg/dL (05 Dec 2020 23:55)    LIVER FUNCTIONS - ( 06 Dec 2020 08:51 )  Alb: 3.6 g/dL / Pro: 6.8 g/dL / ALK PHOS: 46 U/L / ALT: 44 U/L / AST: 38 U/L / GGT: x               D DImer  Procalcitonin, Serum: 0.39 ng/mL (12-05 @ 18:16)  D-Dimer Assay, Quantitative: 202 ng/mL DDU (12-06 @ 08:51)      Studies  Chest X-RAY  CT SCAN Chest   CT Abdomen  Venous Dopplers: LE:   Others    < from: Xray Chest 1 View- PORTABLE-Urgent (12.05.20 @ 18:40) >  IQUE: One view of the chest.    COMPARISON: Radiograph chest 10/30/2017.    Projection lordotic.  Rotated right.    FINDINGS:    LUNGS: Questionable vague increased right basilar markings.    PLEURA: No pleural effusion or pneumothorax.    HEART AND MEDIASTINUM: Grossly within normal limits for the projection.    SKELETON: Unremarkable skeletal structures.      IMPRESSION:  Questionable vague increased right basilar markings indeterminate for atelectatic changes or evolving infectious process including from potential atypical viral etiologies, chest CT may be helpful for more definitive evaluation.              NIKOS SOUZA M.D., RADIOLOGY RESIDENT    < end of copied text >  < from: US TTE 2D F/U, Limited w/o Contrast (ED) (12.05.20 @ 23:30) >    ORDER COMMENTS: sob     PROCEDURE DATE:  12/05/2020    FOCUSED ED ULTRASOUND REPORT          INTERPRETATION:A focused transthoracic cardiac ultrasound examination was performed.  No pericardial effusion was present.  No global wall motion abnormality was identified  Diffuse anterior B lines present in multiple lung fields.    IMPRESSION:  No Pericardial Effusion.  Diffuse anterior B lines present  Limited evaluation of the right ventricle    < from: Xray Chest 2 Views PA/Lat (10.30.17 @ 18:22) >    FINDINGS:   The cardiac silhouette is unremarkable.    Clear lungs.  No pleural effusion.   No pneumothorax.    No acute osseous abnormalities.      IMPRESSION:   Clear lungs.                RICKY COLVIN M.D., RADIOLOGY RESIDENT  This document has been electronically signed.  OBED HALL M.D., ATTENDING RADIOLOGIST  This document has been electronically signed. Oct 31 2017  9:26AM              < end of copied text >          FIDEL HALL MD; Attending Emergency Medicine  This document has been electronically signed. Dec  5 2020 11:30PM    < end of copied text >

## 2020-12-06 NOTE — CONSULT NOTE ADULT - SUBJECTIVE AND OBJECTIVE BOX
"HPI:   51 yo M hx CAD s/p 1x stent, COVID+, c/o worsening SOB. Pt states he tested positive for COVID the day before Thanksgiving 10 days ago. This is also the time the pt's symptoms of myalgias, fatigue, cough, and fevers started, which have worsened over time and which is why he is here today. Pt has a pulse-oximeter at home. He notes his O2 sat to be typically 91-94%, and today was 85%. Pt took tylenol 2 houra menjivar.  (05 Dec 2020 19:44)"    Above reviewed. Saw/spoke to patient. Patient initially developed symptoms around Thanksgiving. Since then had a waxing and waning course, but had onset of SOB with hypoxia starting shortly before admission. Today, on high flow NC. Still feels fatigued, weak. Coughing/sob. Intermittent fevers. No other new complaints. No dysuria, no pyuria, no flank pain. ID called for further care COVID.    PAST MEDICAL & SURGICAL HISTORY:  Type 2 diabetes mellitus without complication, without long-term current use of insulin    CAD (coronary artery disease)    Allergies    No Known Allergies    ANTIMICROBIALS:  remdesivir  IVPB    remdesivir  IVPB 100 every 24 hours    OTHER MEDS:  acetaminophen   Tablet .. 650 milliGRAM(s) Oral every 4 hours PRN  aspirin enteric coated 81 milliGRAM(s) Oral daily  clopidogrel Tablet 75 milliGRAM(s) Oral daily  dexAMETHasone  Injectable 6 milliGRAM(s) IV Push daily  dextrose 40% Gel 15 Gram(s) Oral once  dextrose 5%. 1000 milliLiter(s) IV Continuous <Continuous>  dextrose 5%. 1000 milliLiter(s) IV Continuous <Continuous>  dextrose 50% Injectable 25 Gram(s) IV Push once  dextrose 50% Injectable 12.5 Gram(s) IV Push once  dextrose 50% Injectable 25 Gram(s) IV Push once  enoxaparin Injectable 40 milliGRAM(s) SubCutaneous every 12 hours  glucagon  Injectable 1 milliGRAM(s) IntraMuscular once  influenza   Vaccine 0.5 milliLiter(s) IntraMuscular once  insulin lispro (ADMELOG) corrective regimen sliding scale   SubCutaneous three times a day before meals  insulin lispro (ADMELOG) corrective regimen sliding scale   SubCutaneous at bedtime  sodium chloride 0.9%. 1000 milliLiter(s) IV Continuous <Continuous>    SOCIAL HISTORY: No tobacco, no alcohol, no illicit drugs    FAMILY HISTORY: No pertinent family history relating to chief complaint    Drug Dosing Weight  Height (cm): 180.3 (05 Dec 2020 17:09)  Weight (kg): 117 (05 Dec 2020 17:09)  BMI (kg/m2): 36 (05 Dec 2020 17:09)  BSA (m2): 2.35 (05 Dec 2020 17:09)    PE:    Vital Signs Last 24 Hrs  T(C): 38.1 (06 Dec 2020 05:00), Max: 38.1 (06 Dec 2020 05:00)  T(F): 100.5 (06 Dec 2020 05:00), Max: 100.5 (06 Dec 2020 05:00)  HR: 92 (06 Dec 2020 09:01) (77 - 100)  BP: 129/72 (06 Dec 2020 05:00) (114/74 - 149/79)  BP(mean): 102 (05 Dec 2020 19:44) (102 - 102)  RR: 18 (06 Dec 2020 09:01) (18 - 24)  SpO2: 93% (06 Dec 2020 09:01) (88% - 100%)    Gen: AOx3, NAD, non-toxic, pleasant  CV: S1+S2 normal, nontachycardic  Resp: Clear bilat, no resp distress, no crackles/wheezes, high flow NC  Abd: Soft, nontender, +BS  Ext: No LE edema, no wounds  : No Tavares  IV/Skin: No thrombophlebitis  Msk: No low back pain, no arthralgias, no joint swelling  Neuro: No sensory deficits, no motor deficits    LABS:                        13.1   10.32 )-----------( 274      ( 06 Dec 2020 08:52 )             38.0     12-06    135  |  99  |  16  ----------------------------<  277<H>  4.1   |  21<L>  |  0.77    Ca    9.1      06 Dec 2020 08:51    TPro  6.8  /  Alb  3.6  /  TBili  0.4  /  DBili  x   /  AST  38  /  ALT  44  /  AlkPhos  46  12-06    MICROBIOLOGY:    COVID+    RADIOLOGY:    12/5 XR:    IMPRESSION:  Questionable vague increased right basilar markings indeterminate for atelectatic changes or evolving infectious process including from potential atypical viral etiologies, chest CT may be helpful for more definitive evaluation.

## 2020-12-06 NOTE — CONSULT NOTE ADULT - ASSESSMENT
51 yo M hx CAD s/p 1x stent, COVID+, c/o worsening SOB. Pt states he tested positive for COVID the day before Thanksgiving 10 days ago. This is also the time the pt's symptoms of myalgias, fatigue, cough, and fevers started, which have worsened over time and which is why he is here today. Pt has a pulse-oximeter at home. He notes his O2 sat to be typically 91-94%, and today was 85%. Pt took tylenol 2 houra menjivar.     COVID 19 Pneumonia: Hypoxic resp failure  JUVENTINO  Uncontrolled blood glcuse dm  CAD  HLD     12/6:    COVID 19 Pneumonia: Hypoxic resp failure: On 50% high flow: Inflammatory markers are high except d dimer: cont to attempt to lower fio2 requirement: already on covid rx:   JUVENTINO: not u sing cpap at home  Uncontrolled blood glcuse dm:  contr per endo primary team   CAD: cont home meds:   DVT prophylaxis  DW NP
49 yo M hx CAD s/p 1x stent, COVID+, c/o worsening SOB  Fever, no leukocytosis  COVID+  CXR consistent with COVID, I reviewed personally  Hypoxic on high flow NC  Symptoms started around Thanksgiving  Overall,  1) COVID  - Symptoms started < 14 days ago, patient on high O2 supplementation  - Dex 6mg q 24, 10 day course  - Remdisivir 100mg q 24 (complete 5 days, monitor LFTs)  - No signs secondary bacterial infection presently  2) SOB  - Any role for PE workup/anticoagulation per primary team  - O2 supplementation per primary team    Ethan Sanders MD  Pager [phone]  After 5pm and on weekends call [phone]

## 2020-12-06 NOTE — PROGRESS NOTE ADULT - SUBJECTIVE AND OBJECTIVE BOX
INTERVAL HPI/OVERNIGHT EVENTS: I feel same . Was hungry today so ate. No diarrhea.   Vital Signs Last 24 Hrs  T(C): 36.8 (06 Dec 2020 11:13), Max: 38.1 (06 Dec 2020 05:00)  T(F): 98.3 (06 Dec 2020 11:13), Max: 100.5 (06 Dec 2020 05:00)  HR: 88 (06 Dec 2020 11:13) (77 - 100)  BP: 132/77 (06 Dec 2020 11:13) (114/74 - 149/79)  BP(mean): 102 (05 Dec 2020 19:44) (102 - 102)  RR: 18 (06 Dec 2020 11:13) (18 - 24)  SpO2: 93% (06 Dec 2020 11:13) (88% - 100%)  I&O's Summary    05 Dec 2020 07:01  -  06 Dec 2020 07:00  --------------------------------------------------------  IN: 1300 mL / OUT: 0 mL / NET: 1300 mL    06 Dec 2020 07:01  -  06 Dec 2020 16:56  --------------------------------------------------------  IN: 480 mL / OUT: 600 mL / NET: -120 mL      MEDICATIONS  (STANDING):  ALBUTerol    90 MICROgram(s) HFA Inhaler 2 Puff(s) Inhalation every 6 hours  aspirin enteric coated 81 milliGRAM(s) Oral daily  clopidogrel Tablet 75 milliGRAM(s) Oral daily  dexAMETHasone  Injectable 6 milliGRAM(s) IV Push daily  dextrose 40% Gel 15 Gram(s) Oral once  dextrose 5%. 1000 milliLiter(s) (50 mL/Hr) IV Continuous <Continuous>  dextrose 5%. 1000 milliLiter(s) (100 mL/Hr) IV Continuous <Continuous>  dextrose 50% Injectable 25 Gram(s) IV Push once  dextrose 50% Injectable 12.5 Gram(s) IV Push once  dextrose 50% Injectable 25 Gram(s) IV Push once  enoxaparin Injectable 40 milliGRAM(s) SubCutaneous every 12 hours  glucagon  Injectable 1 milliGRAM(s) IntraMuscular once  influenza   Vaccine 0.5 milliLiter(s) IntraMuscular once  insulin glargine Injectable (LANTUS) 20 Unit(s) SubCutaneous at bedtime  insulin lispro (ADMELOG) corrective regimen sliding scale   SubCutaneous three times a day before meals  insulin lispro (ADMELOG) corrective regimen sliding scale   SubCutaneous at bedtime  remdesivir  IVPB   IV Intermittent   remdesivir  IVPB 100 milliGRAM(s) IV Intermittent every 24 hours  sodium chloride 0.9%. 1000 milliLiter(s) (50 mL/Hr) IV Continuous <Continuous>    MEDICATIONS  (PRN):  acetaminophen   Tablet .. 650 milliGRAM(s) Oral every 4 hours PRN Temp greater or equal to 38.5C (101.3F)  benzonatate 100 milliGRAM(s) Oral every 8 hours PRN Cough    LABS:                        13.1   10.32 )-----------( 274      ( 06 Dec 2020 08:52 )             38.0     12-06    135  |  99  |  16  ----------------------------<  277<H>  4.1   |  21<L>  |  0.77    Ca    9.1      06 Dec 2020 08:51    TPro  6.8  /  Alb  3.6  /  TBili  0.4  /  DBili  x   /  AST  38  /  ALT  44  /  AlkPhos  46  12-06        CAPILLARY BLOOD GLUCOSE      POCT Blood Glucose.: 312 mg/dL (06 Dec 2020 16:45)  POCT Blood Glucose.: 282 mg/dL (06 Dec 2020 11:40)  POCT Blood Glucose.: 273 mg/dL (06 Dec 2020 07:53)  POCT Blood Glucose.: 279 mg/dL (05 Dec 2020 23:55)          REVIEW OF SYSTEMS:  CONSTITUTIONAL: No fever, weight loss, or fatigue  EYES: No eye pain, visual disturbances, or discharge  ENMT:  No difficulty hearing, tinnitus, vertigo; No sinus or throat pain  NECK: No pain or stiffness  RESPIRATORY: No cough, wheezing, chills or hemoptysis; but  shortness of breath  CARDIOVASCULAR: No chest pain, palpitations, dizziness, or leg swelling  GASTROINTESTINAL: No abdominal or epigastric pain. No nausea, vomiting, or hematemesis; No diarrhea or constipation. No melena or hematochezia.  GENITOURINARY: No dysuria, frequency, hematuria, or incontinence  NEUROLOGICAL: No headaches, memory loss, loss of strength, numbness, or tremors      Consultant(s) Notes Reviewed:  [x ] YES  [ ] NO    PHYSICAL EXAM:  GENERAL: NAD, HF NC   HEAD:  Atraumatic, Normocephalic  EYES: EOMI, PERRLA, conjunctiva and sclera clear  ENMT: No tonsillar erythema, exudates, or enlargement; Moist mucous membranes, Good dentition, No lesions  NECK: Supple, No JVD, Normal thyroid  NERVOUS SYSTEM:  Alert & Oriented X3, No focal deficit   CHEST/LUNG: Good air entry bilateral with rales,   HEART: Regular rate and rhythm; No murmurs, rubs, or gallops  ABDOMEN: Soft, Nontender, Nondistended; Bowel sounds present  EXTREMITIES:  2+ Peripheral Pulses, No clubbing, cyanosis, or edema    Care Discussed with Consultants/Other Providers [ x] YES  [ ] NO

## 2020-12-06 NOTE — PROGRESS NOTE ADULT - ASSESSMENT
49 yo M hx CAD s/p 1x stent, COVID+, c/o worsening SOB. Pt states he tested positive for COVID the day before Thanksgiving 10 days ago. This is also the time the pt's symptoms of myalgias, fatigue, cough, and fevers started, which have worsened over time and which is why he is here today. Pt has a pulse-oximeter at home. He notes his O2 sat to be typically 91-94%, and today was 85%. Pt took tylenol 2 houra menjivar.      Problem/Plan - 1:  ·  Problem: Acute respiratory failure with hypoxia.  Plan: Now on HF NC oxygen . Pulmonary following.      Problem/Plan - 2:  ·  Problem:  Sepsis.  Plan: Present on admission.     Likely sec to Viral infection. IVF ,Antiviral .      Problem/Plan - 3:  ·  Problem: Pneumonia.  Plan: Sec to COVID .      Problem/Plan - 4:  ·  Problem: COVID-19.  Plan: Started on Dexamethasone and Remidisvir . ID help appreciated.      Problem/Plan - 5:  ·  Problem: Lactic acid acidosis.  Plan: IVF. Trending down.      Problem/Plan - 6:  Problem: CAD (coronary artery disease). Plan: S/P stent in oct 2019 . DAPT .     Problem/Plan - 7:  ·  Problem: Diabetes with Hyperglycemia .  Plan: Sugars high . On Steroid. SSI and now added Lantus .     Problem/Plan - 8:  ·  Problem: HLD (hyperlipidemia).  Plan: Holding as on remidisvir and watching LFT .      Problem/Plan - 9:  ·  Problem: Diarrhea.  Plan: Resolved.

## 2020-12-07 DIAGNOSIS — J96.01 ACUTE RESPIRATORY FAILURE WITH HYPOXIA: ICD-10-CM

## 2020-12-07 DIAGNOSIS — U07.1 COVID-19: ICD-10-CM

## 2020-12-07 LAB
ALBUMIN SERPL ELPH-MCNC: 3.1 G/DL — LOW (ref 3.3–5)
ALBUMIN SERPL ELPH-MCNC: 3.1 G/DL — LOW (ref 3.3–5)
ALP SERPL-CCNC: 46 U/L — SIGNIFICANT CHANGE UP (ref 40–120)
ALP SERPL-CCNC: 46 U/L — SIGNIFICANT CHANGE UP (ref 40–120)
ALT FLD-CCNC: 53 U/L — HIGH (ref 10–45)
ALT FLD-CCNC: 53 U/L — HIGH (ref 10–45)
ANION GAP SERPL CALC-SCNC: 11 MMOL/L — SIGNIFICANT CHANGE UP (ref 5–17)
AST SERPL-CCNC: 39 U/L — SIGNIFICANT CHANGE UP (ref 10–40)
AST SERPL-CCNC: 39 U/L — SIGNIFICANT CHANGE UP (ref 10–40)
BASE EXCESS BLDA CALC-SCNC: 0.1 MMOL/L — SIGNIFICANT CHANGE UP (ref -2–2)
BASE EXCESS BLDA CALC-SCNC: 0.6 MMOL/L — SIGNIFICANT CHANGE UP (ref -2–2)
BILIRUB DIRECT SERPL-MCNC: <0.1 MG/DL — SIGNIFICANT CHANGE UP (ref 0–0.2)
BILIRUB INDIRECT FLD-MCNC: >0.2 MG/DL — SIGNIFICANT CHANGE UP (ref 0.2–1)
BILIRUB SERPL-MCNC: 0.2 MG/DL — SIGNIFICANT CHANGE UP (ref 0.2–1.2)
BILIRUB SERPL-MCNC: 0.3 MG/DL — SIGNIFICANT CHANGE UP (ref 0.2–1.2)
BUN SERPL-MCNC: 20 MG/DL — SIGNIFICANT CHANGE UP (ref 7–23)
CALCIUM SERPL-MCNC: 9.2 MG/DL — SIGNIFICANT CHANGE UP (ref 8.4–10.5)
CHLORIDE SERPL-SCNC: 103 MMOL/L — SIGNIFICANT CHANGE UP (ref 96–108)
CO2 BLDA-SCNC: 25 MMOL/L — SIGNIFICANT CHANGE UP (ref 22–30)
CO2 BLDA-SCNC: 25 MMOL/L — SIGNIFICANT CHANGE UP (ref 22–30)
CO2 SERPL-SCNC: 23 MMOL/L — SIGNIFICANT CHANGE UP (ref 22–31)
CREAT SERPL-MCNC: 0.75 MG/DL — SIGNIFICANT CHANGE UP (ref 0.5–1.3)
CREAT SERPL-MCNC: 0.75 MG/DL — SIGNIFICANT CHANGE UP (ref 0.5–1.3)
CRP SERPL-MCNC: 16.33 MG/DL — HIGH (ref 0–0.4)
D DIMER BLD IA.RAPID-MCNC: 202 NG/ML DDU — SIGNIFICANT CHANGE UP
FERRITIN SERPL-MCNC: 962 NG/ML — HIGH (ref 30–400)
GAS PNL BLDA: SIGNIFICANT CHANGE UP
GAS PNL BLDA: SIGNIFICANT CHANGE UP
GLUCOSE BLDC GLUCOMTR-MCNC: 230 MG/DL — HIGH (ref 70–99)
GLUCOSE BLDC GLUCOMTR-MCNC: 260 MG/DL — HIGH (ref 70–99)
GLUCOSE BLDC GLUCOMTR-MCNC: 265 MG/DL — HIGH (ref 70–99)
GLUCOSE BLDC GLUCOMTR-MCNC: 272 MG/DL — HIGH (ref 70–99)
GLUCOSE BLDC GLUCOMTR-MCNC: 273 MG/DL — HIGH (ref 70–99)
GLUCOSE BLDC GLUCOMTR-MCNC: 299 MG/DL — HIGH (ref 70–99)
GLUCOSE SERPL-MCNC: 243 MG/DL — HIGH (ref 70–99)
HCO3 BLDA-SCNC: 24 MMOL/L — SIGNIFICANT CHANGE UP (ref 21–29)
HCO3 BLDA-SCNC: 24 MMOL/L — SIGNIFICANT CHANGE UP (ref 21–29)
HCT VFR BLD CALC: 37.1 % — LOW (ref 39–50)
HGB BLD-MCNC: 12.3 G/DL — LOW (ref 13–17)
HOROWITZ INDEX BLDA+IHG-RTO: 100 — SIGNIFICANT CHANGE UP
LDH SERPL L TO P-CCNC: 429 U/L — HIGH (ref 50–242)
MAGNESIUM SERPL-MCNC: 2.3 MG/DL — SIGNIFICANT CHANGE UP (ref 1.6–2.6)
MCHC RBC-ENTMCNC: 29.8 PG — SIGNIFICANT CHANGE UP (ref 27–34)
MCHC RBC-ENTMCNC: 33.2 GM/DL — SIGNIFICANT CHANGE UP (ref 32–36)
MCV RBC AUTO: 89.8 FL — SIGNIFICANT CHANGE UP (ref 80–100)
NRBC # BLD: 0 /100 WBCS — SIGNIFICANT CHANGE UP (ref 0–0)
NT-PROBNP SERPL-SCNC: 268 PG/ML — SIGNIFICANT CHANGE UP (ref 0–300)
PCO2 BLDA: 35 MMHG — SIGNIFICANT CHANGE UP (ref 32–46)
PCO2 BLDA: 37 MMHG — SIGNIFICANT CHANGE UP (ref 32–46)
PH BLDA: 7.42 — SIGNIFICANT CHANGE UP (ref 7.35–7.45)
PH BLDA: 7.45 — SIGNIFICANT CHANGE UP (ref 7.35–7.45)
PLATELET # BLD AUTO: 320 K/UL — SIGNIFICANT CHANGE UP (ref 150–400)
PO2 BLDA: 37 MMHG — CRITICAL LOW (ref 74–108)
PO2 BLDA: 77 MMHG — SIGNIFICANT CHANGE UP (ref 74–108)
POTASSIUM SERPL-MCNC: 4 MMOL/L — SIGNIFICANT CHANGE UP (ref 3.5–5.3)
POTASSIUM SERPL-SCNC: 4 MMOL/L — SIGNIFICANT CHANGE UP (ref 3.5–5.3)
PROCALCITONIN SERPL-MCNC: 0.38 NG/ML — HIGH (ref 0.02–0.1)
PROT SERPL-MCNC: 6.3 G/DL — SIGNIFICANT CHANGE UP (ref 6–8.3)
PROT SERPL-MCNC: 6.4 G/DL — SIGNIFICANT CHANGE UP (ref 6–8.3)
RBC # BLD: 4.13 M/UL — LOW (ref 4.2–5.8)
RBC # FLD: 11.8 % — SIGNIFICANT CHANGE UP (ref 10.3–14.5)
SAO2 % BLDA: 67 % — LOW (ref 92–96)
SAO2 % BLDA: 95 % — SIGNIFICANT CHANGE UP (ref 92–96)
SODIUM SERPL-SCNC: 137 MMOL/L — SIGNIFICANT CHANGE UP (ref 135–145)
WBC # BLD: 9.74 K/UL — SIGNIFICANT CHANGE UP (ref 3.8–10.5)
WBC # FLD AUTO: 9.74 K/UL — SIGNIFICANT CHANGE UP (ref 3.8–10.5)

## 2020-12-07 PROCEDURE — 99232 SBSQ HOSP IP/OBS MODERATE 35: CPT

## 2020-12-07 RX ORDER — INSULIN GLARGINE 100 [IU]/ML
30 INJECTION, SOLUTION SUBCUTANEOUS AT BEDTIME
Refills: 0 | Status: DISCONTINUED | OUTPATIENT
Start: 2020-12-07 | End: 2020-12-08

## 2020-12-07 RX ORDER — POTASSIUM CHLORIDE 20 MEQ
20 PACKET (EA) ORAL
Refills: 0 | Status: COMPLETED | OUTPATIENT
Start: 2020-12-07 | End: 2020-12-07

## 2020-12-07 RX ADMIN — Medication 8: at 16:57

## 2020-12-07 RX ADMIN — ENOXAPARIN SODIUM 40 MILLIGRAM(S): 100 INJECTION SUBCUTANEOUS at 17:02

## 2020-12-07 RX ADMIN — Medication 6 MILLIGRAM(S): at 05:14

## 2020-12-07 RX ADMIN — CLOPIDOGREL BISULFATE 75 MILLIGRAM(S): 75 TABLET, FILM COATED ORAL at 12:03

## 2020-12-07 RX ADMIN — ENOXAPARIN SODIUM 40 MILLIGRAM(S): 100 INJECTION SUBCUTANEOUS at 05:15

## 2020-12-07 RX ADMIN — Medication 20 MILLIEQUIVALENT(S): at 14:10

## 2020-12-07 RX ADMIN — REMDESIVIR 500 MILLIGRAM(S): 5 INJECTION INTRAVENOUS at 17:18

## 2020-12-07 RX ADMIN — Medication 20 MILLIEQUIVALENT(S): at 12:03

## 2020-12-07 RX ADMIN — INSULIN GLARGINE 30 UNIT(S): 100 INJECTION, SOLUTION SUBCUTANEOUS at 21:56

## 2020-12-07 RX ADMIN — Medication 1: at 21:56

## 2020-12-07 RX ADMIN — ALBUTEROL 2 PUFF(S): 90 AEROSOL, METERED ORAL at 05:15

## 2020-12-07 RX ADMIN — Medication 20 MILLIEQUIVALENT(S): at 10:40

## 2020-12-07 RX ADMIN — Medication 100 MILLIGRAM(S): at 22:56

## 2020-12-07 RX ADMIN — Medication 12: at 12:20

## 2020-12-07 RX ADMIN — Medication 100 MILLIGRAM(S): at 13:04

## 2020-12-07 RX ADMIN — Medication 12: at 08:36

## 2020-12-07 RX ADMIN — Medication 81 MILLIGRAM(S): at 12:03

## 2020-12-07 RX ADMIN — ALBUTEROL 2 PUFF(S): 90 AEROSOL, METERED ORAL at 17:02

## 2020-12-07 RX ADMIN — ALBUTEROL 2 PUFF(S): 90 AEROSOL, METERED ORAL at 12:21

## 2020-12-07 NOTE — PROVIDER CONTACT NOTE (OTHER) - SITUATION
Patient oxygen saturation yielded 83%, patient asymptomatic, patient turned to the side and Chest PT performed, patient able to bring up mucus, patient's oxygen saturation remains to be in the 85%.

## 2020-12-07 NOTE — PROGRESS NOTE ADULT - ASSESSMENT
51 yo M hx CAD s/p 1x stent, COVID+, c/o worsening SOB  Fever, no leukocytosis  COVID+  CXR consistent with COVID, I reviewed personally  Hypoxic on high flow NC  Symptoms started around Thanksgiving  Overall,  1) COVID  - Symptoms started < 14 days ago, patient on high O2 supplementation  - Dex 6mg q 24, 10 day course  - Remdisivir 100mg q 24 (complete 5 days, monitor LFTs)  - No signs secondary bacterial infection presently  2) SOB  - Any role for PE workup/anticoagulation per primary team  - O2 supplementation per primary team    Ethan Sanders MD  Pager [phone]  After 5pm and on weekends call [phone]

## 2020-12-07 NOTE — PROGRESS NOTE ADULT - ASSESSMENT
49 yo M hx CAD s/p 1x stent, COVID+, c/o worsening SOB. Pt states he tested positive for COVID the day before Thanksgiving. This is also the time the pt's symptoms of myalgias, fatigue, cough, and fevers started, which have worsened over time and which is why he is here today. Pt has a pulse-oximeter at home, noted to have sat mid/low 80s.

## 2020-12-07 NOTE — PROGRESS NOTE ADULT - SUBJECTIVE AND OBJECTIVE BOX
Follow-up Pulm Progress Note    worsening hypoxia this AM  dry cough  Sats 94% high flow 60L/100%  Mild dyspnea, denies CP      Medications:  MEDICATIONS  (STANDING):  ALBUTerol    90 MICROgram(s) HFA Inhaler 2 Puff(s) Inhalation every 6 hours  aspirin enteric coated 81 milliGRAM(s) Oral daily  clopidogrel Tablet 75 milliGRAM(s) Oral daily  dexAMETHasone  Injectable 6 milliGRAM(s) IV Push daily  dextrose 40% Gel 15 Gram(s) Oral once  dextrose 5%. 1000 milliLiter(s) (50 mL/Hr) IV Continuous <Continuous>  dextrose 5%. 1000 milliLiter(s) (100 mL/Hr) IV Continuous <Continuous>  dextrose 50% Injectable 25 Gram(s) IV Push once  dextrose 50% Injectable 12.5 Gram(s) IV Push once  dextrose 50% Injectable 25 Gram(s) IV Push once  enoxaparin Injectable 40 milliGRAM(s) SubCutaneous every 12 hours  glucagon  Injectable 1 milliGRAM(s) IntraMuscular once  influenza   Vaccine 0.5 milliLiter(s) IntraMuscular once  insulin glargine Injectable (LANTUS) 30 Unit(s) SubCutaneous at bedtime  insulin lispro (ADMELOG) corrective regimen sliding scale   SubCutaneous three times a day before meals  insulin lispro (ADMELOG) corrective regimen sliding scale   SubCutaneous at bedtime  potassium chloride    Tablet ER 20 milliEquivalent(s) Oral every 2 hours  remdesivir  IVPB 100 milliGRAM(s) IV Intermittent every 24 hours  remdesivir  IVPB   IV Intermittent     MEDICATIONS  (PRN):  acetaminophen   Tablet .. 650 milliGRAM(s) Oral every 4 hours PRN Temp greater or equal to 38.5C (101.3F)  benzonatate 100 milliGRAM(s) Oral every 8 hours PRN Cough          Vital Signs Last 24 Hrs  T(C): 36.8 (07 Dec 2020 12:19), Max: 37.2 (06 Dec 2020 20:44)  T(F): 98.2 (07 Dec 2020 12:19), Max: 98.9 (06 Dec 2020 20:44)  HR: 76 (07 Dec 2020 12:19) (76 - 92)  BP: 125/73 (07 Dec 2020 12:19) (119/67 - 158/80)  BP(mean): --  RR: 18 (07 Dec 2020 12:19) (18 - 26)  SpO2: 93% (07 Dec 2020 12:19) (88% - 95%)      VBG pH 7.38 12-05 @ 19:40    VBG pCO2 46 12-05 @ 19:40    VBG O2 sat 64 12-05 @ 19:40    VBG lactate 2.3 12-05 @ 19:40  VBG pH 7.39 12-05 @ 18:16    VBG pCO2 40 12-05 @ 18:16    VBG O2 sat 84 12-05 @ 18:16    VBG lactate 3.1 12-05 @ 18:16      12-06 @ 07:01  -  12-07 @ 07:00  --------------------------------------------------------  IN: 1570 mL / OUT: 1100 mL / NET: 470 mL          LABS:                        12.3   9.74  )-----------( 320      ( 07 Dec 2020 05:41 )             37.1     12-07    137  |  103  |  20  ----------------------------<  243<H>  4.0   |  23  |  0.75    Ca    9.2      07 Dec 2020 05:41  Mg     2.3     12-07    TPro  6.3  /  Alb  3.1<L>  /  TBili  0.2  /  DBili  <0.1  /  AST  39  /  ALT  53<H>  /  AlkPhos  46  12-07          CAPILLARY BLOOD GLUCOSE      POCT Blood Glucose.: 299 mg/dL (07 Dec 2020 12:13)        Procalcitonin, Serum: 0.39 ng/mL (12-05-20 @ 18:16)          Physical Examination:  PULM: decreased BS  CVS: RRR    RADIOLOGY REVIEWED  CXR 12/5: bilateral patchy opacities

## 2020-12-07 NOTE — PROGRESS NOTE ADULT - PROBLEM SELECTOR PLAN 1
+COVID PCR  -C/w Remdesivir x 5 days. Trending LFTs, creatinine.   -C/w Decadron x 10 days  -Change tessalon perle 100mg from PRN to standing q8h  -Trend inflammatory markers  -DVT ppx

## 2020-12-07 NOTE — PROGRESS NOTE ADULT - SUBJECTIVE AND OBJECTIVE BOX
INTERVAL HPI/OVERNIGHT EVENTS: I feel same not worse.   Vital Signs Last 24 Hrs  T(C): 36.9 (07 Dec 2020 16:11), Max: 37.2 (06 Dec 2020 20:44)  T(F): 98.5 (07 Dec 2020 16:11), Max: 98.9 (06 Dec 2020 20:44)  HR: 70 (07 Dec 2020 17:17) (70 - 92)  BP: 130/81 (07 Dec 2020 16:11) (119/67 - 158/80)  BP(mean): --  RR: 20 (07 Dec 2020 17:17) (18 - 26)  SpO2: 90% (07 Dec 2020 17:17) (88% - 95%)  I&O's Summary    06 Dec 2020 07:01  -  07 Dec 2020 07:00  --------------------------------------------------------  IN: 1570 mL / OUT: 1100 mL / NET: 470 mL    07 Dec 2020 07:01  -  07 Dec 2020 20:11  --------------------------------------------------------  IN: 400 mL / OUT: 1050 mL / NET: -650 mL      MEDICATIONS  (STANDING):  ALBUTerol    90 MICROgram(s) HFA Inhaler 2 Puff(s) Inhalation every 6 hours  aspirin enteric coated 81 milliGRAM(s) Oral daily  benzonatate 100 milliGRAM(s) Oral three times a day  clopidogrel Tablet 75 milliGRAM(s) Oral daily  dexAMETHasone  Injectable 6 milliGRAM(s) IV Push daily  dextrose 40% Gel 15 Gram(s) Oral once  dextrose 5%. 1000 milliLiter(s) (50 mL/Hr) IV Continuous <Continuous>  dextrose 5%. 1000 milliLiter(s) (100 mL/Hr) IV Continuous <Continuous>  dextrose 50% Injectable 25 Gram(s) IV Push once  dextrose 50% Injectable 12.5 Gram(s) IV Push once  dextrose 50% Injectable 25 Gram(s) IV Push once  enoxaparin Injectable 40 milliGRAM(s) SubCutaneous every 12 hours  glucagon  Injectable 1 milliGRAM(s) IntraMuscular once  influenza   Vaccine 0.5 milliLiter(s) IntraMuscular once  insulin glargine Injectable (LANTUS) 30 Unit(s) SubCutaneous at bedtime  insulin lispro (ADMELOG) corrective regimen sliding scale   SubCutaneous three times a day before meals  insulin lispro (ADMELOG) corrective regimen sliding scale   SubCutaneous at bedtime  remdesivir  IVPB 100 milliGRAM(s) IV Intermittent every 24 hours  remdesivir  IVPB   IV Intermittent     MEDICATIONS  (PRN):  acetaminophen   Tablet .. 650 milliGRAM(s) Oral every 4 hours PRN Temp greater or equal to 38.5C (101.3F)    LABS:                        12.3   9.74  )-----------( 320      ( 07 Dec 2020 05:41 )             37.1     12-07    137  |  103  |  20  ----------------------------<  243<H>  4.0   |  23  |  0.75    Ca    9.2      07 Dec 2020 05:41  Mg     2.3     12-07    TPro  6.3  /  Alb  3.1<L>  /  TBili  0.2  /  DBili  <0.1  /  AST  39  /  ALT  53<H>  /  AlkPhos  46  12-07        CAPILLARY BLOOD GLUCOSE      POCT Blood Glucose.: 230 mg/dL (07 Dec 2020 16:50)  POCT Blood Glucose.: 299 mg/dL (07 Dec 2020 12:13)  POCT Blood Glucose.: 273 mg/dL (07 Dec 2020 08:34)  POCT Blood Glucose.: 260 mg/dL (07 Dec 2020 08:16)  POCT Blood Glucose.: 305 mg/dL (06 Dec 2020 21:16)      ABG - ( 07 Dec 2020 15:46 )  pH, Arterial: 7.45  pH, Blood: x     /  pCO2: 35    /  pO2: 77    / HCO3: 24    / Base Excess: .6    /  SaO2: 95                  REVIEW OF SYSTEMS:  CONSTITUTIONAL: No fever, weight loss, or fatigue  EYES: No eye pain, visual disturbances, or discharge  ENMT:  No difficulty hearing, tinnitus, vertigo; No sinus or throat pain  NECK: No pain or stiffness  RESPIRATORY: No cough, wheezing, chills or hemoptysis; still  shortness of breath  CARDIOVASCULAR: No chest pain, palpitations, dizziness, or leg swelling  GASTROINTESTINAL: No abdominal or epigastric pain. No nausea, vomiting, or hematemesis; No diarrhea or constipation. No melena or hematochezia.  GENITOURINARY: No dysuria, frequency, hematuria, or incontinence  NEUROLOGICAL: No headaches, memory loss, loss of strength, numbness, or tremors  SKIN: No itching, burning, rashes, or lesions       Consultant(s) Notes Reviewed:  [x ] YES  [ ] NO    PHYSICAL EXAM:  GENERAL: NAD, HF Oxygen   HEAD:  Atraumatic, Normocephalic  EYES: EOMI, PERRLA, conjunctiva and sclera clear  ENMT: No tonsillar erythema, exudates, or enlargement; Moist mucous membranes, Good dentition, No lesions  NECK: Supple, No JVD, Normal thyroid  NERVOUS SYSTEM:  Alert & Oriented X3, No focal deficit   CHEST/LUNG: Good air entry bilateral with  rales,   HEART: Regular rate and rhythm; No murmurs, rubs, or gallops  ABDOMEN: Soft, Nontender, Nondistended; Bowel sounds present  EXTREMITIES:  2+ Peripheral Pulses, No clubbing, cyanosis, or edema    Care Discussed with Consultants/Other Providers [ x] YES  [ ] NO

## 2020-12-07 NOTE — PROGRESS NOTE ADULT - PROBLEM SELECTOR PLAN 2
2nd to COVID PNA  -CXR 12/5 bilateral patchy opacities   -Prone positioning q2-3hrs as tolerated   -Worsening hypoxia this AM. Currently on high flow NS 60L/100% fiO2  -If worsening hypoxia may require bipap or mechanical ventilation   -Ddimer low (202)  -Check ABG  -Pro-BNP and procalcitonin added to AM labs

## 2020-12-07 NOTE — PROGRESS NOTE ADULT - SUBJECTIVE AND OBJECTIVE BOX
CC: F/U for COVID    Saw/spoke to patient. No fevers, no chills. No new complaints. Unchanged.    Allergies  No Known Allergies    ANTIMICROBIALS:  remdesivir  IVPB 100 every 24 hours  remdesivir  IVPB      PE:    Vital Signs Last 24 Hrs  T(C): 36.8 (07 Dec 2020 12:19), Max: 37.2 (06 Dec 2020 20:44)  T(F): 98.2 (07 Dec 2020 12:19), Max: 98.9 (06 Dec 2020 20:44)  HR: 76 (07 Dec 2020 12:19) (76 - 92)  BP: 125/73 (07 Dec 2020 12:19) (119/67 - 158/80)  RR: 18 (07 Dec 2020 12:19) (18 - 26)  SpO2: 93% (07 Dec 2020 12:19) (88% - 95%)    Gen: AOx3, NAD, non-toxic  CV: S1+S2 normal, nontachycardic  Resp: Clear bilat, no resp distress, no crackles/wheezes, high flow NC  Abd: Soft, nontender, +BS  Ext: No LE edema, no wounds    LABS:                        12.3   9.74  )-----------( 320      ( 07 Dec 2020 05:41 )             37.1     12-07    137  |  103  |  20  ----------------------------<  243<H>  4.0   |  23  |  0.75    Ca    9.2      07 Dec 2020 05:41  Mg     2.3     12-07    TPro  6.3  /  Alb  3.1<L>  /  TBili  0.2  /  DBili  <0.1  /  AST  39  /  ALT  53<H>  /  AlkPhos  46  12-07    MICROBIOLOGY:    COVID+    RADIOLOGY:    12/5  XR:    IMPRESSION:  Questionable vague increased right basilar markings indeterminate for atelectatic changes or evolving infectious process including from potential atypical viral etiologies, chest CT may be helpful for more definitive evaluation.

## 2020-12-07 NOTE — PROGRESS NOTE ADULT - ASSESSMENT
51 yo M hx CAD s/p 1x stent, COVID+, c/o worsening SOB. Pt states he tested positive for COVID the day before Thanksgiving 10 days ago. This is also the time the pt's symptoms of myalgias, fatigue, cough, and fevers started, which have worsened over time and which is why he is here today. Pt has a pulse-oximeter at home. He notes his O2 sat to be typically 91-94%, and today was 85%. Pt took tylenol 2 houra menjivar.      Problem/Plan - 1:  ·  Problem: Acute respiratory failure with hypoxia.  Plan: Now on HF NC oxygen . Pulmonary following.      Problem/Plan - 2:  ·  Problem:  Sepsis.  Plan: Present on admission.     Likely sec to Viral infection. IVF ,Antiviral .      Problem/Plan - 3:  ·  Problem: Pneumonia.  Plan: Sec to COVID .      Problem/Plan - 4:  ·  Problem: COVID-19.  Plan: Started on Dexamethasone and Remidisvir . ID help appreciated.      Problem/Plan - 5:  ·  Problem: Lactic acid acidosis.  Plan: IVF. Trending down.      Problem/Plan - 6:  Problem: CAD (coronary artery disease). Plan: S/P stent in oct 2019 . DAPT .     Problem/Plan - 7:  ·  Problem: Diabetes with Hyperglycemia .  Plan: Sugars high . On Steroid. SSI and increased  Lantus .     Problem/Plan - 8:  ·  Problem: HLD (hyperlipidemia).  Plan: Holding as on Remidisvir and watching LFT .      Problem/Plan - 9:  ·  Problem: Diarrhea.  Plan: Resolved.

## 2020-12-07 NOTE — PROVIDER CONTACT NOTE (OTHER) - BACKGROUND
Patient arrived on the unit 24 hours ago with 40liters and 50% oxygen high liza.  patient stable ever since. Patient desaturated for the first time now.

## 2020-12-08 LAB
ALBUMIN SERPL ELPH-MCNC: 3.1 G/DL — LOW (ref 3.3–5)
ALP SERPL-CCNC: 53 U/L — SIGNIFICANT CHANGE UP (ref 40–120)
ALT FLD-CCNC: 58 U/L — HIGH (ref 10–45)
ANION GAP SERPL CALC-SCNC: 14 MMOL/L — SIGNIFICANT CHANGE UP (ref 5–17)
AST SERPL-CCNC: 40 U/L — SIGNIFICANT CHANGE UP (ref 10–40)
BASOPHILS # BLD AUTO: 0 K/UL — SIGNIFICANT CHANGE UP (ref 0–0.2)
BASOPHILS NFR BLD AUTO: 0 % — SIGNIFICANT CHANGE UP (ref 0–2)
BILIRUB SERPL-MCNC: 0.2 MG/DL — SIGNIFICANT CHANGE UP (ref 0.2–1.2)
BUN SERPL-MCNC: 20 MG/DL — SIGNIFICANT CHANGE UP (ref 7–23)
CALCIUM SERPL-MCNC: 9.5 MG/DL — SIGNIFICANT CHANGE UP (ref 8.4–10.5)
CHLORIDE SERPL-SCNC: 105 MMOL/L — SIGNIFICANT CHANGE UP (ref 96–108)
CO2 SERPL-SCNC: 23 MMOL/L — SIGNIFICANT CHANGE UP (ref 22–31)
CREAT SERPL-MCNC: 0.75 MG/DL — SIGNIFICANT CHANGE UP (ref 0.5–1.3)
EOSINOPHIL # BLD AUTO: 0.09 K/UL — SIGNIFICANT CHANGE UP (ref 0–0.5)
EOSINOPHIL NFR BLD AUTO: 0.9 % — SIGNIFICANT CHANGE UP (ref 0–6)
GLUCOSE BLDC GLUCOMTR-MCNC: 179 MG/DL — HIGH (ref 70–99)
GLUCOSE BLDC GLUCOMTR-MCNC: 211 MG/DL — HIGH (ref 70–99)
GLUCOSE BLDC GLUCOMTR-MCNC: 224 MG/DL — HIGH (ref 70–99)
GLUCOSE BLDC GLUCOMTR-MCNC: 272 MG/DL — HIGH (ref 70–99)
GLUCOSE SERPL-MCNC: 195 MG/DL — HIGH (ref 70–99)
HCT VFR BLD CALC: 39.9 % — SIGNIFICANT CHANGE UP (ref 39–50)
HGB BLD-MCNC: 13.4 G/DL — SIGNIFICANT CHANGE UP (ref 13–17)
LYMPHOCYTES # BLD AUTO: 1.46 K/UL — SIGNIFICANT CHANGE UP (ref 1–3.3)
LYMPHOCYTES # BLD AUTO: 15 % — SIGNIFICANT CHANGE UP (ref 13–44)
MANUAL SMEAR VERIFICATION: SIGNIFICANT CHANGE UP
MCHC RBC-ENTMCNC: 30.7 PG — SIGNIFICANT CHANGE UP (ref 27–34)
MCHC RBC-ENTMCNC: 33.6 GM/DL — SIGNIFICANT CHANGE UP (ref 32–36)
MCV RBC AUTO: 91.5 FL — SIGNIFICANT CHANGE UP (ref 80–100)
MONOCYTES # BLD AUTO: 0.26 K/UL — SIGNIFICANT CHANGE UP (ref 0–0.9)
MONOCYTES NFR BLD AUTO: 2.7 % — SIGNIFICANT CHANGE UP (ref 2–14)
NEUTROPHILS # BLD AUTO: 7.91 K/UL — HIGH (ref 1.8–7.4)
NEUTROPHILS NFR BLD AUTO: 80.5 % — HIGH (ref 43–77)
NEUTS BAND # BLD: 0.9 % — SIGNIFICANT CHANGE UP (ref 0–8)
NRBC # BLD: 1 /100 — HIGH (ref 0–0)
PLAT MORPH BLD: NORMAL — SIGNIFICANT CHANGE UP
PLATELET # BLD AUTO: 366 K/UL — SIGNIFICANT CHANGE UP (ref 150–400)
POTASSIUM SERPL-MCNC: 4.3 MMOL/L — SIGNIFICANT CHANGE UP (ref 3.5–5.3)
POTASSIUM SERPL-SCNC: 4.3 MMOL/L — SIGNIFICANT CHANGE UP (ref 3.5–5.3)
PROT SERPL-MCNC: 6.3 G/DL — SIGNIFICANT CHANGE UP (ref 6–8.3)
RBC # BLD: 4.36 M/UL — SIGNIFICANT CHANGE UP (ref 4.2–5.8)
RBC # FLD: 11.9 % — SIGNIFICANT CHANGE UP (ref 10.3–14.5)
RBC BLD AUTO: SIGNIFICANT CHANGE UP
SODIUM SERPL-SCNC: 142 MMOL/L — SIGNIFICANT CHANGE UP (ref 135–145)
WBC # BLD: 9.72 K/UL — SIGNIFICANT CHANGE UP (ref 3.8–10.5)
WBC # FLD AUTO: 9.72 K/UL — SIGNIFICANT CHANGE UP (ref 3.8–10.5)

## 2020-12-08 PROCEDURE — 99232 SBSQ HOSP IP/OBS MODERATE 35: CPT

## 2020-12-08 RX ORDER — INSULIN GLARGINE 100 [IU]/ML
35 INJECTION, SOLUTION SUBCUTANEOUS AT BEDTIME
Refills: 0 | Status: DISCONTINUED | OUTPATIENT
Start: 2020-12-08 | End: 2020-12-14

## 2020-12-08 RX ADMIN — ALBUTEROL 2 PUFF(S): 90 AEROSOL, METERED ORAL at 13:01

## 2020-12-08 RX ADMIN — Medication 6 MILLIGRAM(S): at 05:46

## 2020-12-08 RX ADMIN — Medication 8: at 08:28

## 2020-12-08 RX ADMIN — Medication 100 MILLIGRAM(S): at 05:45

## 2020-12-08 RX ADMIN — INSULIN GLARGINE 35 UNIT(S): 100 INJECTION, SOLUTION SUBCUTANEOUS at 22:02

## 2020-12-08 RX ADMIN — ALBUTEROL 2 PUFF(S): 90 AEROSOL, METERED ORAL at 00:15

## 2020-12-08 RX ADMIN — Medication 100 MILLIGRAM(S): at 22:00

## 2020-12-08 RX ADMIN — Medication 81 MILLIGRAM(S): at 12:25

## 2020-12-08 RX ADMIN — Medication 8: at 17:06

## 2020-12-08 RX ADMIN — CLOPIDOGREL BISULFATE 75 MILLIGRAM(S): 75 TABLET, FILM COATED ORAL at 12:25

## 2020-12-08 RX ADMIN — Medication 12: at 12:25

## 2020-12-08 RX ADMIN — Medication 100 MILLIGRAM(S): at 13:02

## 2020-12-08 RX ADMIN — ENOXAPARIN SODIUM 40 MILLIGRAM(S): 100 INJECTION SUBCUTANEOUS at 17:06

## 2020-12-08 RX ADMIN — ALBUTEROL 2 PUFF(S): 90 AEROSOL, METERED ORAL at 05:46

## 2020-12-08 RX ADMIN — REMDESIVIR 500 MILLIGRAM(S): 5 INJECTION INTRAVENOUS at 17:50

## 2020-12-08 RX ADMIN — ENOXAPARIN SODIUM 40 MILLIGRAM(S): 100 INJECTION SUBCUTANEOUS at 05:46

## 2020-12-08 RX ADMIN — ALBUTEROL 2 PUFF(S): 90 AEROSOL, METERED ORAL at 17:07

## 2020-12-08 NOTE — PROGRESS NOTE ADULT - SUBJECTIVE AND OBJECTIVE BOX
CC: F/U for COVID    Saw/spoke to patient. No fevers, no chills. No new complaints. Overnight had desaturations and needed period of proning.    Allergies  No Known Allergies    ANTIMICROBIALS:  remdesivir  IVPB 100 every 24 hours  remdesivir  IVPB      PE:    Vital Signs Last 24 Hrs  T(C): 36.4 (08 Dec 2020 11:59), Max: 36.9 (07 Dec 2020 16:11)  T(F): 97.5 (08 Dec 2020 11:59), Max: 98.5 (07 Dec 2020 16:11)  HR: 69 (08 Dec 2020 11:59) (63 - 71)  BP: 125/76 (08 Dec 2020 11:59) (113/72 - 143/76)  RR: 18 (08 Dec 2020 11:59) (18 - 24)  SpO2: 92% (08 Dec 2020 11:59) (90% - 99%)    Gen: AOx3, NAD, non-toxic  Resp: no resp distress, high flow NC    (limited exam based on COVID protocol)    LABS:                        13.4   9.72  )-----------( 366      ( 08 Dec 2020 06:24 )             39.9     12-08    142  |  105  |  20  ----------------------------<  195<H>  4.3   |  23  |  0.75    Ca    9.5      08 Dec 2020 06:24  Mg     2.3     12-07    TPro  6.3  /  Alb  3.1<L>  /  TBili  0.2  /  DBili  x   /  AST  40  /  ALT  58<H>  /  AlkPhos  53  12-08    MICROBIOLOGY:    COVID PCR    RADIOLOGY:    12/5 XR:    IMPRESSION:  Questionable vague increased right basilar markings indeterminate for atelectatic changes or evolving infectious process including from potential atypical viral etiologies, chest CT may be helpful for more definitive evaluation.

## 2020-12-08 NOTE — PROGRESS NOTE ADULT - ASSESSMENT
51 yo M hx CAD s/p 1x stent, COVID+, c/o worsening SOB  Fever, no leukocytosis  COVID+  CXR consistent with COVID, I reviewed personally  Hypoxic on high flow NC  Symptoms started around Thanksgiving  Overall,  1) COVID  - Symptoms started < 14 days ago, patient on high O2 supplementation  - Dex 6mg q 24, 10 day course  - Remdisivir 100mg q 24 (complete 5 days, monitor LFTs)  - No signs secondary bacterial infection presently  2) SOB  - Any role for PE workup/anticoagulation per primary team  - O2 supplementation per primary team    My colleagues will be covering this patient on 12/9/20, I will return 12/10/20.  Please call [phone] with any questions or change in status.     Ethan Sanders MD  Pager [phone]  After 5pm and on weekends call [phone]

## 2020-12-08 NOTE — PROGRESS NOTE ADULT - SUBJECTIVE AND OBJECTIVE BOX
Follow-up Pulm Progress Note    Feels okay  Episode of hypoxia this AM to mid 80s  Currently Sats 96% on high flow 60L/100%   Mild dry cough     Medications:  MEDICATIONS  (STANDING):  ALBUTerol    90 MICROgram(s) HFA Inhaler 2 Puff(s) Inhalation every 6 hours  aspirin enteric coated 81 milliGRAM(s) Oral daily  benzonatate 100 milliGRAM(s) Oral three times a day  clopidogrel Tablet 75 milliGRAM(s) Oral daily  dexAMETHasone  Injectable 6 milliGRAM(s) IV Push daily  dextrose 40% Gel 15 Gram(s) Oral once  dextrose 5%. 1000 milliLiter(s) (50 mL/Hr) IV Continuous <Continuous>  dextrose 5%. 1000 milliLiter(s) (100 mL/Hr) IV Continuous <Continuous>  dextrose 50% Injectable 25 Gram(s) IV Push once  dextrose 50% Injectable 12.5 Gram(s) IV Push once  dextrose 50% Injectable 25 Gram(s) IV Push once  enoxaparin Injectable 40 milliGRAM(s) SubCutaneous every 12 hours  glucagon  Injectable 1 milliGRAM(s) IntraMuscular once  influenza   Vaccine 0.5 milliLiter(s) IntraMuscular once  insulin glargine Injectable (LANTUS) 30 Unit(s) SubCutaneous at bedtime  insulin lispro (ADMELOG) corrective regimen sliding scale   SubCutaneous three times a day before meals  insulin lispro (ADMELOG) corrective regimen sliding scale   SubCutaneous at bedtime  remdesivir  IVPB 100 milliGRAM(s) IV Intermittent every 24 hours  remdesivir  IVPB   IV Intermittent     MEDICATIONS  (PRN):  acetaminophen   Tablet .. 650 milliGRAM(s) Oral every 4 hours PRN Temp greater or equal to 38.5C (101.3F)          Vital Signs Last 24 Hrs  T(C): 36.4 (08 Dec 2020 11:59), Max: 36.9 (07 Dec 2020 16:11)  T(F): 97.5 (08 Dec 2020 11:59), Max: 98.5 (07 Dec 2020 16:11)  HR: 69 (08 Dec 2020 11:59) (63 - 71)  BP: 125/76 (08 Dec 2020 11:59) (113/72 - 143/76)  BP(mean): --  RR: 18 (08 Dec 2020 11:59) (18 - 24)  SpO2: 92% (08 Dec 2020 11:59) (90% - 99%)    ABG - ( 07 Dec 2020 15:46 )  pH, Arterial: 7.45  pH, Blood: x     /  pCO2: 35    /  pO2: 77    / HCO3: 24    / Base Excess: .6    /  SaO2: 95                    12-07 @ 07:01  -  12-08 @ 07:00  --------------------------------------------------------  IN: 640 mL / OUT: 1300 mL / NET: -660 mL          LABS:                        13.4   9.72  )-----------( 366      ( 08 Dec 2020 06:24 )             39.9     12-08    142  |  105  |  20  ----------------------------<  195<H>  4.3   |  23  |  0.75    Ca    9.5      08 Dec 2020 06:24  Mg     2.3     12-07    TPro  6.3  /  Alb  3.1<L>  /  TBili  0.2  /  DBili  x   /  AST  40  /  ALT  58<H>  /  AlkPhos  53  12-08          CAPILLARY BLOOD GLUCOSE      POCT Blood Glucose.: 272 mg/dL (08 Dec 2020 12:15)        Procalcitonin, Serum: 0.38 ng/mL (12-07-20 @ 05:41)  Procalcitonin, Serum: 0.39 ng/mL (12-05-20 @ 18:16)    Serum Pro-Brain Natriuretic Peptide: 268 pg/mL (12-07-20 @ 05:41)          Physical Examination:  PULM: decreased BS  CVS: RRR    RADIOLOGY REVIEWED  CXR 12/5: bilateral patchy opacities

## 2020-12-08 NOTE — PROGRESS NOTE ADULT - ASSESSMENT
51 yo M hx CAD s/p 1x stent, COVID+, c/o worsening SOB and hypoxia. Pt states he tested positive for COVID the day before Thanksgiving. This is also the time the pt's symptoms of myalgias, fatigue, cough, and fevers started, which have worsened over time.

## 2020-12-08 NOTE — PROVIDER CONTACT NOTE (OTHER) - SITUATION
Patient desat to 75% while on high liza nasal cannula 60liters with 100 % o2. Patient proned and patient's oxygen increased to 93%.

## 2020-12-08 NOTE — PROGRESS NOTE ADULT - ASSESSMENT
51 yo M hx CAD s/p 1x stent, COVID+, c/o worsening SOB. Pt states he tested positive for COVID the day before Thanksgiving 10 days ago. This is also the time the pt's symptoms of myalgias, fatigue, cough, and fevers started, which have worsened over time and which is why he is here today. Pt has a pulse-oximeter at home. He notes his O2 sat to be typically 91-94%, and today was 85%. Pt took tylenol 2 houra menjivar.      Problem/Plan - 1:  ·  Problem: Acute respiratory failure with hypoxia.  Plan: Now on HF NC oxygen . Pulmonary following.      Problem/Plan - 2:  ·  Problem:  Sepsis.  Plan: Present on admission.     Likely sec to Viral infection. IVF ,Antiviral .      Problem/Plan - 3:  ·  Problem: Pneumonia.  Plan: Sec to COVID .      Problem/Plan - 4:  ·  Problem: COVID-19.  Plan: Started on Dexamethasone and Remidisvir . ID help appreciated.   Monitoring inflammatory markers.      Problem/Plan - 5:  ·  Problem: Lactic acid acidosis.  Plan: IVF. Trending down.      Problem/Plan - 6:  Problem: CAD (coronary artery disease). Plan: S/P stent in oct 2019 . DAPT .     Problem/Plan - 7:  ·  Problem: Diabetes with Hyperglycemia .  Plan: Sugars high . On Steroid. SSI and increased  Lantus .May need Pre meal .      Problem/Plan - 8:  ·  Problem: HLD (hyperlipidemia).  Plan: Holding as on Remidisvir and watching LFT .      Problem/Plan - 9:  ·  Problem: Diarrhea.  Plan: Resolved.

## 2020-12-08 NOTE — PROGRESS NOTE ADULT - PROBLEM SELECTOR PLAN 2
2nd to COVID PNA  -CXR 12/5 bilateral patchy opacities   -Prone positioning q2-3hrs as tolerated   -Decrease high flow to 50L/90%. Wean supplemental O2 as tolerated, keep sats >90%. Suggest slow wean FiO2.   -If worsening hypoxia may require bipap or mechanical ventilation   -Ddimer low (202)

## 2020-12-08 NOTE — PROGRESS NOTE ADULT - SUBJECTIVE AND OBJECTIVE BOX
INTERVAL HPI/OVERNIGHT EVENTS: I feel same . Need bedside commode.   Vital Signs Last 24 Hrs  T(C): 36.7 (08 Dec 2020 16:30), Max: 36.8 (07 Dec 2020 21:11)  T(F): 98.1 (08 Dec 2020 16:30), Max: 98.2 (07 Dec 2020 21:11)  HR: 69 (08 Dec 2020 17:26) (63 - 71)  BP: 121/74 (08 Dec 2020 16:30) (113/72 - 143/76)  BP(mean): --  RR: 22 (08 Dec 2020 17:26) (18 - 24)  SpO2: 94% (08 Dec 2020 17:26) (92% - 99%)  I&O's Summary    07 Dec 2020 07:01  -  08 Dec 2020 07:00  --------------------------------------------------------  IN: 640 mL / OUT: 1300 mL / NET: -660 mL    08 Dec 2020 07:01  -  08 Dec 2020 17:53  --------------------------------------------------------  IN: 0 mL / OUT: 1400 mL / NET: -1400 mL      MEDICATIONS  (STANDING):  ALBUTerol    90 MICROgram(s) HFA Inhaler 2 Puff(s) Inhalation every 6 hours  aspirin enteric coated 81 milliGRAM(s) Oral daily  benzonatate 100 milliGRAM(s) Oral three times a day  clopidogrel Tablet 75 milliGRAM(s) Oral daily  dexAMETHasone  Injectable 6 milliGRAM(s) IV Push daily  dextrose 40% Gel 15 Gram(s) Oral once  dextrose 5%. 1000 milliLiter(s) (50 mL/Hr) IV Continuous <Continuous>  dextrose 5%. 1000 milliLiter(s) (100 mL/Hr) IV Continuous <Continuous>  dextrose 50% Injectable 25 Gram(s) IV Push once  dextrose 50% Injectable 12.5 Gram(s) IV Push once  dextrose 50% Injectable 25 Gram(s) IV Push once  enoxaparin Injectable 40 milliGRAM(s) SubCutaneous every 12 hours  glucagon  Injectable 1 milliGRAM(s) IntraMuscular once  influenza   Vaccine 0.5 milliLiter(s) IntraMuscular once  insulin glargine Injectable (LANTUS) 35 Unit(s) SubCutaneous at bedtime  insulin lispro (ADMELOG) corrective regimen sliding scale   SubCutaneous three times a day before meals  insulin lispro (ADMELOG) corrective regimen sliding scale   SubCutaneous at bedtime  remdesivir  IVPB 100 milliGRAM(s) IV Intermittent every 24 hours  remdesivir  IVPB   IV Intermittent     MEDICATIONS  (PRN):  acetaminophen   Tablet .. 650 milliGRAM(s) Oral every 4 hours PRN Temp greater or equal to 38.5C (101.3F)    LABS:                        13.4   9.72  )-----------( 366      ( 08 Dec 2020 06:24 )             39.9     12-08    142  |  105  |  20  ----------------------------<  195<H>  4.3   |  23  |  0.75    Ca    9.5      08 Dec 2020 06:24  Mg     2.3     12-07    TPro  6.3  /  Alb  3.1<L>  /  TBili  0.2  /  DBili  x   /  AST  40  /  ALT  58<H>  /  AlkPhos  53  12-08        CAPILLARY BLOOD GLUCOSE      POCT Blood Glucose.: 224 mg/dL (08 Dec 2020 16:48)  POCT Blood Glucose.: 272 mg/dL (08 Dec 2020 12:15)  POCT Blood Glucose.: 211 mg/dL (08 Dec 2020 08:15)  POCT Blood Glucose.: 265 mg/dL (07 Dec 2020 21:54)      ABG - ( 07 Dec 2020 15:46 )  pH, Arterial: 7.45  pH, Blood: x     /  pCO2: 35    /  pO2: 77    / HCO3: 24    / Base Excess: .6    /  SaO2: 95                  REVIEW OF SYSTEMS:  CONSTITUTIONAL: No fever, weight loss, or fatigue  EYES: No eye pain, visual disturbances, or discharge  ENMT:  No difficulty hearing, tinnitus, vertigo; No sinus or throat pain  RESPIRATORY: No cough, wheezing, chills or hemoptysis; No shortness of breath  CARDIOVASCULAR: No chest pain, palpitations, dizziness, or leg swelling  GASTROINTESTINAL: No abdominal or epigastric pain. No nausea, vomiting, or hematemesis; No diarrhea or constipation. No melena or hematochezia.  GENITOURINARY: No dysuria, frequency, hematuria, or incontinence  NEUROLOGICAL: No headaches, memory loss, loss of strength, numbness, or tremors      Consultant(s) Notes Reviewed:  [x ] YES  [ ] NO    PHYSICAL EXAM:  GENERAL: NAD, well-groomed, well-developed, not in any distress ,  HEAD:  Atraumatic, Normocephalic  EYES: EOMI, PERRLA, conjunctiva and sclera clear  ENMT: No tonsillar erythema, exudates, or enlargement; Moist mucous membranes, Good dentition, No lesions  NECK: Supple, No JVD, Normal thyroid  NERVOUS SYSTEM:  Alert & Oriented X3, No focal deficit   CHEST/LUNG: Good air entry bilateral with no  rales, rhonchi, wheezing, or rubs  HEART: Regular rate and rhythm; No murmurs, rubs, or gallops  ABDOMEN: Soft, Nontender, Nondistended; Bowel sounds present  EXTREMITIES:  2+ Peripheral Pulses, No clubbing, cyanosis, or edema    Care Discussed with Consultants/Other Providers [ x] YES  [ ] NO

## 2020-12-09 LAB
ALBUMIN SERPL ELPH-MCNC: 3.3 G/DL — SIGNIFICANT CHANGE UP (ref 3.3–5)
ALP SERPL-CCNC: 57 U/L — SIGNIFICANT CHANGE UP (ref 40–120)
ALT FLD-CCNC: 57 U/L — HIGH (ref 10–45)
ANION GAP SERPL CALC-SCNC: 14 MMOL/L — SIGNIFICANT CHANGE UP (ref 5–17)
AST SERPL-CCNC: 34 U/L — SIGNIFICANT CHANGE UP (ref 10–40)
BILIRUB SERPL-MCNC: 0.4 MG/DL — SIGNIFICANT CHANGE UP (ref 0.2–1.2)
BUN SERPL-MCNC: 23 MG/DL — SIGNIFICANT CHANGE UP (ref 7–23)
CALCIUM SERPL-MCNC: 9.8 MG/DL — SIGNIFICANT CHANGE UP (ref 8.4–10.5)
CHLORIDE SERPL-SCNC: 102 MMOL/L — SIGNIFICANT CHANGE UP (ref 96–108)
CO2 SERPL-SCNC: 26 MMOL/L — SIGNIFICANT CHANGE UP (ref 22–31)
CREAT SERPL-MCNC: 0.81 MG/DL — SIGNIFICANT CHANGE UP (ref 0.5–1.3)
CRP SERPL-MCNC: 5.99 MG/DL — HIGH (ref 0–0.4)
D DIMER BLD IA.RAPID-MCNC: 390 NG/ML DDU — HIGH
FERRITIN SERPL-MCNC: 884 NG/ML — HIGH (ref 30–400)
GLUCOSE BLDC GLUCOMTR-MCNC: 165 MG/DL — HIGH (ref 70–99)
GLUCOSE BLDC GLUCOMTR-MCNC: 172 MG/DL — HIGH (ref 70–99)
GLUCOSE BLDC GLUCOMTR-MCNC: 248 MG/DL — HIGH (ref 70–99)
GLUCOSE BLDC GLUCOMTR-MCNC: 346 MG/DL — HIGH (ref 70–99)
GLUCOSE SERPL-MCNC: 140 MG/DL — HIGH (ref 70–99)
HCT VFR BLD CALC: 40.6 % — SIGNIFICANT CHANGE UP (ref 39–50)
HGB BLD-MCNC: 13.3 G/DL — SIGNIFICANT CHANGE UP (ref 13–17)
MCHC RBC-ENTMCNC: 30 PG — SIGNIFICANT CHANGE UP (ref 27–34)
MCHC RBC-ENTMCNC: 32.8 GM/DL — SIGNIFICANT CHANGE UP (ref 32–36)
MCV RBC AUTO: 91.6 FL — SIGNIFICANT CHANGE UP (ref 80–100)
NRBC # BLD: 0 /100 WBCS — SIGNIFICANT CHANGE UP (ref 0–0)
PLATELET # BLD AUTO: 416 K/UL — HIGH (ref 150–400)
POTASSIUM SERPL-MCNC: 4.2 MMOL/L — SIGNIFICANT CHANGE UP (ref 3.5–5.3)
POTASSIUM SERPL-SCNC: 4.2 MMOL/L — SIGNIFICANT CHANGE UP (ref 3.5–5.3)
PROT SERPL-MCNC: 6.7 G/DL — SIGNIFICANT CHANGE UP (ref 6–8.3)
RBC # BLD: 4.43 M/UL — SIGNIFICANT CHANGE UP (ref 4.2–5.8)
RBC # FLD: 11.9 % — SIGNIFICANT CHANGE UP (ref 10.3–14.5)
SODIUM SERPL-SCNC: 142 MMOL/L — SIGNIFICANT CHANGE UP (ref 135–145)
WBC # BLD: 9.68 K/UL — SIGNIFICANT CHANGE UP (ref 3.8–10.5)
WBC # FLD AUTO: 9.68 K/UL — SIGNIFICANT CHANGE UP (ref 3.8–10.5)

## 2020-12-09 RX ORDER — INSULIN LISPRO 100/ML
4 VIAL (ML) SUBCUTANEOUS
Refills: 0 | Status: DISCONTINUED | OUTPATIENT
Start: 2020-12-09 | End: 2020-12-11

## 2020-12-09 RX ADMIN — CLOPIDOGREL BISULFATE 75 MILLIGRAM(S): 75 TABLET, FILM COATED ORAL at 13:09

## 2020-12-09 RX ADMIN — Medication 100 MILLIGRAM(S): at 05:32

## 2020-12-09 RX ADMIN — ALBUTEROL 2 PUFF(S): 90 AEROSOL, METERED ORAL at 17:55

## 2020-12-09 RX ADMIN — Medication 8: at 17:03

## 2020-12-09 RX ADMIN — ALBUTEROL 2 PUFF(S): 90 AEROSOL, METERED ORAL at 05:33

## 2020-12-09 RX ADMIN — Medication 81 MILLIGRAM(S): at 13:09

## 2020-12-09 RX ADMIN — ENOXAPARIN SODIUM 40 MILLIGRAM(S): 100 INJECTION SUBCUTANEOUS at 17:55

## 2020-12-09 RX ADMIN — Medication 6 MILLIGRAM(S): at 05:33

## 2020-12-09 RX ADMIN — ALBUTEROL 2 PUFF(S): 90 AEROSOL, METERED ORAL at 00:00

## 2020-12-09 RX ADMIN — ALBUTEROL 2 PUFF(S): 90 AEROSOL, METERED ORAL at 13:09

## 2020-12-09 RX ADMIN — Medication 4: at 09:00

## 2020-12-09 RX ADMIN — Medication 100 MILLIGRAM(S): at 22:02

## 2020-12-09 RX ADMIN — REMDESIVIR 500 MILLIGRAM(S): 5 INJECTION INTRAVENOUS at 17:55

## 2020-12-09 RX ADMIN — INSULIN GLARGINE 35 UNIT(S): 100 INJECTION, SOLUTION SUBCUTANEOUS at 22:06

## 2020-12-09 RX ADMIN — ENOXAPARIN SODIUM 40 MILLIGRAM(S): 100 INJECTION SUBCUTANEOUS at 05:33

## 2020-12-09 RX ADMIN — Medication 16: at 13:09

## 2020-12-09 RX ADMIN — Medication 100 MILLIGRAM(S): at 13:09

## 2020-12-09 NOTE — DIETITIAN INITIAL EVALUATION ADULT. - NSPROEDAREADYLEARN_GEN_A_NUR
acuteness of illness/Briefly discussed nutrition recommendation to focus on consuming carb + protein with meals for elevated BG. Discussed A1c level and meaning. Full education is not appropriate at this time considering pt's current illness/status. Will provide DM nutrition education when appropriate. Also encouraged pt to hydrate adequately and focus on protein-energy intake.

## 2020-12-09 NOTE — DIETITIAN INITIAL EVALUATION ADULT. - ORAL INTAKE PTA/DIET HISTORY
Pt reports decreased appetite and PO intake with illness since 11/25. Also endorses diarrhea PTA and in-house. Pt does not check BG at home, states he was pre-diabetic and now is dx with diabetes this admission. A1c is 10.8%, pt takes metformin at home.

## 2020-12-09 NOTE — DIETITIAN INITIAL EVALUATION ADULT. - REASON INDICATOR FOR ASSESSMENT
Pt seen for length of stay on COVID-19 unit  Unable to conduct a face to face interview or nutrition-focused physical exam due to limited contact restrictions related to Pt's medical condition and isolation precautions. Information obtained via phone call with pt and from EMR.

## 2020-12-09 NOTE — DIETITIAN INITIAL EVALUATION ADULT. - ADD RECOMMEND
Will add peanut butter crackers daily for encouraged protein-energy intake. Monitor GI tolerance to diet, labs, BG.

## 2020-12-09 NOTE — PROGRESS NOTE ADULT - PROBLEM SELECTOR PLAN 1
+COVID PCR  -C/w Remdesivir x 5 days. Trending LFTs, creatinine.   -C/w Decadron x 10 days  -C/w tessalon perle 100mg q8h  -Trend inflammatory markers  -DVT ppx

## 2020-12-09 NOTE — DIETITIAN INITIAL EVALUATION ADULT. - PERTINENT LABORATORY DATA
12-09 Na142 mmol/L Glu 140 mg/dL<H> K+ 4.2 mmol/L Cr  0.81 mg/dL BUN 23 mg/dL Phos n/a   Alb 3.3 g/dL PAB n/a       POCT Blood Glucose.: 172 mg/dL (12-09-20 @ 08:13)  POCT Blood Glucose.: 179 mg/dL (12-08-20 @ 21:58)  POCT Blood Glucose.: 224 mg/dL (12-08-20 @ 16:48)  POCT Blood Glucose.: 272 mg/dL (12-08-20 @ 12:15)

## 2020-12-09 NOTE — DIETITIAN INITIAL EVALUATION ADULT. - ORAL NUTRITION SUPPLEMENTS
Continue diet mighty shakes daily, pt is aware of availability of Ensure if he wishes to try. Pt is happy with MS for now. Remove high protein apple juice per pt request

## 2020-12-09 NOTE — PROGRESS NOTE ADULT - PROBLEM SELECTOR PLAN 2
2nd to COVID PNA  -CXR 12/5 bilateral patchy opacities   -Prone positioning q2-3hrs as tolerated   -Decrease high flow to 50L/70%. Wean supplemental O2 as tolerated, keep sats >90%. Suggest slow wean FiO2.   -If worsening hypoxia may require bipap or mechanical ventilation   -Ddimer <500

## 2020-12-09 NOTE — DIETITIAN INITIAL EVALUATION ADULT. - PERTINENT MEDS FT
MEDICATIONS  (STANDING):  ALBUTerol    90 MICROgram(s) HFA Inhaler 2 Puff(s) Inhalation every 6 hours  aspirin enteric coated 81 milliGRAM(s) Oral daily  benzonatate 100 milliGRAM(s) Oral three times a day  clopidogrel Tablet 75 milliGRAM(s) Oral daily  dexAMETHasone  Injectable 6 milliGRAM(s) IV Push daily  dextrose 40% Gel 15 Gram(s) Oral once  dextrose 5%. 1000 milliLiter(s) (50 mL/Hr) IV Continuous <Continuous>  dextrose 5%. 1000 milliLiter(s) (100 mL/Hr) IV Continuous <Continuous>  dextrose 50% Injectable 25 Gram(s) IV Push once  dextrose 50% Injectable 12.5 Gram(s) IV Push once  dextrose 50% Injectable 25 Gram(s) IV Push once  enoxaparin Injectable 40 milliGRAM(s) SubCutaneous every 12 hours  glucagon  Injectable 1 milliGRAM(s) IntraMuscular once  influenza   Vaccine 0.5 milliLiter(s) IntraMuscular once  insulin glargine Injectable (LANTUS) 35 Unit(s) SubCutaneous at bedtime  insulin lispro (ADMELOG) corrective regimen sliding scale   SubCutaneous three times a day before meals  insulin lispro (ADMELOG) corrective regimen sliding scale   SubCutaneous at bedtime  remdesivir  IVPB 100 milliGRAM(s) IV Intermittent every 24 hours  remdesivir  IVPB   IV Intermittent

## 2020-12-09 NOTE — DIETITIAN INITIAL EVALUATION ADULT. - CHIEF COMPLAINT
The patient is a 50y Male complaining of fever, admitted for COVID-19. Pt also with sepsis, currently on HFNC. Noted pt with hx of DM, A1c of 10.8%

## 2020-12-09 NOTE — DIETITIAN INITIAL EVALUATION ADULT. - OTHER INFO
This admission: Poor PO intake/appetite continues in-house. Pt is mainly taking things like fruit and yogurt. He states the high-protein gelatin caused GI upset, tolerating mighty shake- will change to chocolate per pt's preference.    Confirms NKFA, no nausea/vomiting, no difficulty chewing/swallowing, Vitamin D3, Mg, Zn, fish oil supplementation PTA, last BM today, reports diarrhea continues but is resolving.     Weight Hx: Endorses -270 pounds and recent wt loss due to poor PO intake PTA. Dosing wt is 257 pounds indicating ~13 pound wt loss x2 weeks.

## 2020-12-09 NOTE — PROGRESS NOTE ADULT - ASSESSMENT
49 yo M hx CAD s/p 1x stent, COVID+, c/o worsening SOB. Pt states he tested positive for COVID the day before Thanksgiving 10 days ago. This is also the time the pt's symptoms of myalgias, fatigue, cough, and fevers started, which have worsened over time and which is why he is here today. Pt has a pulse-oximeter at home. He notes his O2 sat to be typically 91-94%, and today was 85%. Pt took tylenol 2 houra menjivar.      Problem/Plan - 1:  ·  Problem: Acute respiratory failure with hypoxia.  Plan: Now on HF NC oxygen and titrating down. Pulmonary following.      Problem/Plan - 2:  ·  Problem:  Sepsis.  Plan: Present on admission.     Likely sec to Viral infection. IVF ,Antiviral .      Problem/Plan - 3:  ·  Problem: Pneumonia.  Plan: Sec to COVID .      Problem/Plan - 4:  ·  Problem: COVID-19.  Plan: Started on Dexamethasone and Remidisvir . ID help appreciated.   Monitoring inflammatory markers.   CRP trending down.      Problem/Plan - 5:  ·  Problem: Lactic acid acidosis.  Plan: IVF. Trending down.      Problem/Plan - 6:  Problem: CAD (coronary artery disease). Plan: S/P stent in oct 2019 . DAPT .     Problem/Plan - 7:  ·  Problem: Diabetes with Hyperglycemia .  Plan: Sugars high . On Steroid. SSI and increased  Lantus . Will start  Pre meal .      Problem/Plan - 8:  ·  Problem: HLD (hyperlipidemia).  Plan: Holding as on Remidisvir and watching LFT .      Problem/Plan - 9:  ·  Problem: Diarrhea.  Plan: Resolved.

## 2020-12-09 NOTE — CHART NOTE - TREATMENT: THE FOLLOWING DIET HAS BEEN RECOMMENDED
Diet, Consistent Carbohydrate w/Evening Snack (12-05-20 @ 20:37) [Active]    mighty shakes, added peanut butter crackers, recommend to add Multivitamin

## 2020-12-09 NOTE — PROGRESS NOTE ADULT - SUBJECTIVE AND OBJECTIVE BOX
INTERVAL HPI/OVERNIGHT EVENTS: I feel better. No new concerns.   Vital Signs Last 24 Hrs  T(C): 36.6 (09 Dec 2020 12:33), Max: 37.1 (09 Dec 2020 08:00)  T(F): 97.9 (09 Dec 2020 12:33), Max: 98.7 (09 Dec 2020 08:00)  HR: 65 (09 Dec 2020 16:21) (60 - 85)  BP: 122/71 (09 Dec 2020 12:33) (122/71 - 135/79)  BP(mean): --  RR: 18 (09 Dec 2020 16:21) (18 - 22)  SpO2: 95% (09 Dec 2020 16:21) (93% - 99%)  I&O's Summary    08 Dec 2020 07:01  -  09 Dec 2020 07:00  --------------------------------------------------------  IN: 830 mL / OUT: 2200 mL / NET: -1370 mL    09 Dec 2020 07:01  -  09 Dec 2020 17:46  --------------------------------------------------------  IN: 0 mL / OUT: 800 mL / NET: -800 mL      MEDICATIONS  (STANDING):  ALBUTerol    90 MICROgram(s) HFA Inhaler 2 Puff(s) Inhalation every 6 hours  aspirin enteric coated 81 milliGRAM(s) Oral daily  benzonatate 100 milliGRAM(s) Oral three times a day  clopidogrel Tablet 75 milliGRAM(s) Oral daily  dexAMETHasone  Injectable 6 milliGRAM(s) IV Push daily  dextrose 40% Gel 15 Gram(s) Oral once  dextrose 5%. 1000 milliLiter(s) (50 mL/Hr) IV Continuous <Continuous>  dextrose 5%. 1000 milliLiter(s) (100 mL/Hr) IV Continuous <Continuous>  dextrose 50% Injectable 25 Gram(s) IV Push once  dextrose 50% Injectable 12.5 Gram(s) IV Push once  dextrose 50% Injectable 25 Gram(s) IV Push once  enoxaparin Injectable 40 milliGRAM(s) SubCutaneous every 12 hours  glucagon  Injectable 1 milliGRAM(s) IntraMuscular once  influenza   Vaccine 0.5 milliLiter(s) IntraMuscular once  insulin glargine Injectable (LANTUS) 35 Unit(s) SubCutaneous at bedtime  insulin lispro (ADMELOG) corrective regimen sliding scale   SubCutaneous three times a day before meals  insulin lispro (ADMELOG) corrective regimen sliding scale   SubCutaneous at bedtime  remdesivir  IVPB 100 milliGRAM(s) IV Intermittent every 24 hours  remdesivir  IVPB   IV Intermittent     MEDICATIONS  (PRN):  acetaminophen   Tablet .. 650 milliGRAM(s) Oral every 4 hours PRN Temp greater or equal to 38.5C (101.3F)    LABS:                        13.3   9.68  )-----------( 416      ( 09 Dec 2020 05:39 )             40.6     12-09    142  |  102  |  23  ----------------------------<  140<H>  4.2   |  26  |  0.81    Ca    9.8      09 Dec 2020 05:39    TPro  6.7  /  Alb  3.3  /  TBili  0.4  /  DBili  x   /  AST  34  /  ALT  57<H>  /  AlkPhos  57  12-09        CAPILLARY BLOOD GLUCOSE      POCT Blood Glucose.: 248 mg/dL (09 Dec 2020 16:39)  POCT Blood Glucose.: 346 mg/dL (09 Dec 2020 12:11)  POCT Blood Glucose.: 172 mg/dL (09 Dec 2020 08:13)  POCT Blood Glucose.: 179 mg/dL (08 Dec 2020 21:58)          REVIEW OF SYSTEMS:  CONSTITUTIONAL: No fever, weight loss, or fatigue  EYES: No eye pain, visual disturbances, or discharge  ENMT:  No difficulty hearing, tinnitus, vertigo; No sinus or throat pain  NECK: No pain or stiffness  RESPIRATORY: No cough, wheezing, chills or hemoptysis; No shortness of breath  CARDIOVASCULAR: No chest pain, palpitations, dizziness, or leg swelling  GASTROINTESTINAL: No abdominal or epigastric pain. No nausea, vomiting, or hematemesis; No diarrhea or constipation. No melena or hematochezia.  GENITOURINARY: No dysuria, frequency, hematuria, or incontinence  NEUROLOGICAL: No headaches, memory loss, loss of strength, numbness, or tremors    RADIOLOGY & ADDITIONAL TESTS:    Consultant(s) Notes Reviewed:  [x ] YES  [ ] NO    PHYSICAL EXAM:  GENERAL: NAD,HF Oxygen   HEAD:  Atraumatic, Normocephalic  EYES: EOMI, PERRLA, conjunctiva and sclera clear  ENMT: No tonsillar erythema, exudates, or enlargement; Moist mucous membranes, Good dentition, No lesions  NECK: Supple, No JVD, Normal thyroid  NERVOUS SYSTEM:  Alert & Oriented X3, No focal deficit   CHEST/LUNG: Good air entry bilateral with no  rales, rhonchi, wheezing, or rubs  HEART: Regular rate and rhythm; No murmurs, rubs, or gallops  ABDOMEN: Soft, Nontender, Nondistended; Bowel sounds present  EXTREMITIES:  2+ Peripheral Pulses, No clubbing, cyanosis, or edema  SKIN: No rashes or lesions    Care Discussed with Consultants/Other Providers [ x] YES  [ ] NO

## 2020-12-10 LAB
ALBUMIN SERPL ELPH-MCNC: 3 G/DL — LOW (ref 3.3–5)
ALP SERPL-CCNC: 48 U/L — SIGNIFICANT CHANGE UP (ref 40–120)
ALT FLD-CCNC: 42 U/L — SIGNIFICANT CHANGE UP (ref 10–45)
ANION GAP SERPL CALC-SCNC: 13 MMOL/L — SIGNIFICANT CHANGE UP (ref 5–17)
AST SERPL-CCNC: 23 U/L — SIGNIFICANT CHANGE UP (ref 10–40)
BASOPHILS # BLD AUTO: 0.13 K/UL — SIGNIFICANT CHANGE UP (ref 0–0.2)
BASOPHILS NFR BLD AUTO: 1.4 % — SIGNIFICANT CHANGE UP (ref 0–2)
BILIRUB SERPL-MCNC: 0.4 MG/DL — SIGNIFICANT CHANGE UP (ref 0.2–1.2)
BUN SERPL-MCNC: 20 MG/DL — SIGNIFICANT CHANGE UP (ref 7–23)
CALCIUM SERPL-MCNC: 9.2 MG/DL — SIGNIFICANT CHANGE UP (ref 8.4–10.5)
CHLORIDE SERPL-SCNC: 103 MMOL/L — SIGNIFICANT CHANGE UP (ref 96–108)
CO2 SERPL-SCNC: 23 MMOL/L — SIGNIFICANT CHANGE UP (ref 22–31)
CREAT SERPL-MCNC: 0.75 MG/DL — SIGNIFICANT CHANGE UP (ref 0.5–1.3)
CRP SERPL-MCNC: 3.71 MG/DL — HIGH (ref 0–0.4)
D DIMER BLD IA.RAPID-MCNC: 388 NG/ML DDU — HIGH
EOSINOPHIL # BLD AUTO: 0.16 K/UL — SIGNIFICANT CHANGE UP (ref 0–0.5)
EOSINOPHIL NFR BLD AUTO: 1.7 % — SIGNIFICANT CHANGE UP (ref 0–6)
FERRITIN SERPL-MCNC: 872 NG/ML — HIGH (ref 30–400)
GLUCOSE BLDC GLUCOMTR-MCNC: 176 MG/DL — HIGH (ref 70–99)
GLUCOSE BLDC GLUCOMTR-MCNC: 196 MG/DL — HIGH (ref 70–99)
GLUCOSE BLDC GLUCOMTR-MCNC: 252 MG/DL — HIGH (ref 70–99)
GLUCOSE BLDC GLUCOMTR-MCNC: 268 MG/DL — HIGH (ref 70–99)
GLUCOSE SERPL-MCNC: 176 MG/DL — HIGH (ref 70–99)
HCT VFR BLD CALC: 44.8 % — SIGNIFICANT CHANGE UP (ref 39–50)
HGB BLD-MCNC: 14.8 G/DL — SIGNIFICANT CHANGE UP (ref 13–17)
IMM GRANULOCYTES NFR BLD AUTO: 3.9 % — HIGH (ref 0–1.5)
LYMPHOCYTES # BLD AUTO: 1.45 K/UL — SIGNIFICANT CHANGE UP (ref 1–3.3)
LYMPHOCYTES # BLD AUTO: 15.2 % — SIGNIFICANT CHANGE UP (ref 13–44)
MCHC RBC-ENTMCNC: 30.9 PG — SIGNIFICANT CHANGE UP (ref 27–34)
MCHC RBC-ENTMCNC: 33 GM/DL — SIGNIFICANT CHANGE UP (ref 32–36)
MCV RBC AUTO: 93.5 FL — SIGNIFICANT CHANGE UP (ref 80–100)
MONOCYTES # BLD AUTO: 0.41 K/UL — SIGNIFICANT CHANGE UP (ref 0–0.9)
MONOCYTES NFR BLD AUTO: 4.3 % — SIGNIFICANT CHANGE UP (ref 2–14)
NEUTROPHILS # BLD AUTO: 6.99 K/UL — SIGNIFICANT CHANGE UP (ref 1.8–7.4)
NEUTROPHILS NFR BLD AUTO: 73.5 % — SIGNIFICANT CHANGE UP (ref 43–77)
NRBC # BLD: 0 /100 WBCS — SIGNIFICANT CHANGE UP (ref 0–0)
PLATELET # BLD AUTO: 334 K/UL — SIGNIFICANT CHANGE UP (ref 150–400)
POTASSIUM SERPL-MCNC: 4.4 MMOL/L — SIGNIFICANT CHANGE UP (ref 3.5–5.3)
POTASSIUM SERPL-SCNC: 4.4 MMOL/L — SIGNIFICANT CHANGE UP (ref 3.5–5.3)
PROT SERPL-MCNC: 6.3 G/DL — SIGNIFICANT CHANGE UP (ref 6–8.3)
RBC # BLD: 4.79 M/UL — SIGNIFICANT CHANGE UP (ref 4.2–5.8)
RBC # FLD: 12 % — SIGNIFICANT CHANGE UP (ref 10.3–14.5)
SODIUM SERPL-SCNC: 139 MMOL/L — SIGNIFICANT CHANGE UP (ref 135–145)
WBC # BLD: 9.51 K/UL — SIGNIFICANT CHANGE UP (ref 3.8–10.5)
WBC # FLD AUTO: 9.51 K/UL — SIGNIFICANT CHANGE UP (ref 3.8–10.5)

## 2020-12-10 PROCEDURE — 99232 SBSQ HOSP IP/OBS MODERATE 35: CPT

## 2020-12-10 RX ADMIN — Medication 1 TABLET(S): at 12:12

## 2020-12-10 RX ADMIN — Medication 100 MILLIGRAM(S): at 22:15

## 2020-12-10 RX ADMIN — ALBUTEROL 2 PUFF(S): 90 AEROSOL, METERED ORAL at 05:50

## 2020-12-10 RX ADMIN — ALBUTEROL 2 PUFF(S): 90 AEROSOL, METERED ORAL at 18:02

## 2020-12-10 RX ADMIN — ENOXAPARIN SODIUM 40 MILLIGRAM(S): 100 INJECTION SUBCUTANEOUS at 18:02

## 2020-12-10 RX ADMIN — Medication 4 UNIT(S): at 16:46

## 2020-12-10 RX ADMIN — ALBUTEROL 2 PUFF(S): 90 AEROSOL, METERED ORAL at 12:12

## 2020-12-10 RX ADMIN — Medication 6 MILLIGRAM(S): at 05:44

## 2020-12-10 RX ADMIN — Medication 12: at 16:46

## 2020-12-10 RX ADMIN — INSULIN GLARGINE 35 UNIT(S): 100 INJECTION, SOLUTION SUBCUTANEOUS at 22:12

## 2020-12-10 RX ADMIN — Medication 81 MILLIGRAM(S): at 12:12

## 2020-12-10 RX ADMIN — Medication 12: at 12:11

## 2020-12-10 RX ADMIN — Medication 4 UNIT(S): at 08:51

## 2020-12-10 RX ADMIN — Medication 4: at 08:51

## 2020-12-10 RX ADMIN — ALBUTEROL 2 PUFF(S): 90 AEROSOL, METERED ORAL at 23:08

## 2020-12-10 RX ADMIN — Medication 100 MILLIGRAM(S): at 13:28

## 2020-12-10 RX ADMIN — ENOXAPARIN SODIUM 40 MILLIGRAM(S): 100 INJECTION SUBCUTANEOUS at 05:44

## 2020-12-10 RX ADMIN — Medication 100 MILLIGRAM(S): at 05:44

## 2020-12-10 RX ADMIN — CLOPIDOGREL BISULFATE 75 MILLIGRAM(S): 75 TABLET, FILM COATED ORAL at 12:12

## 2020-12-10 RX ADMIN — ALBUTEROL 2 PUFF(S): 90 AEROSOL, METERED ORAL at 00:00

## 2020-12-10 NOTE — PROGRESS NOTE ADULT - PROVIDER SPECIALTY LIST ADULT
Ezio Marshall returns in follow-up of left distal radius and scaphoid fractures.  Here for X-rays out of cast.  No complaints.    PE: normal alignment, normal distal neurovascular exam.  Nontender.    X-rays: Maintained alignment of distal radius, fracture not fully healed.    Clinical decision-making: Fracture healing well.  Placed in short arm cast.  Follow-up in 2 weeks for XOOC, left wrist NAVICULAR views.      Internal Medicine

## 2020-12-10 NOTE — PROGRESS NOTE ADULT - PROBLEM SELECTOR PLAN 2
2nd to COVID PNA  -CXR 12/5 bilateral patchy opacities   -Hypoxia improving  -Ddimer <500  -Decrease high flow to 40L/50%. Wean supplemental O2 as tolerated, keep sats >90%.

## 2020-12-10 NOTE — PROGRESS NOTE ADULT - PROBLEM SELECTOR PLAN 1
+COVID PCR  -S/p Remdesivir x 5 days (completed 12/9)  -C/w Decadron x 10 days  -C/w tessalon perle 100mg q8h  -Trend inflammatory markers  -DVT ppx

## 2020-12-10 NOTE — PROGRESS NOTE ADULT - ASSESSMENT
51 yo M hx CAD s/p 1x stent, COVID+, c/o worsening SOB. Pt states he tested positive for COVID the day before Thanksgiving 10 days ago. This is also the time the pt's symptoms of myalgias, fatigue, cough, and fevers started, which have worsened over time and which is why he is here today. Pt has a pulse-oximeter at home. He notes his O2 sat to be typically 91-94%, and today was 85%. Pt took tylenol 2 houra menjivar.      Problem/Plan - 1:  ·  Problem: Acute respiratory failure with hypoxia.  Plan: Now on HF NC oxygen and titrating down. Pulmonary following.      Problem/Plan - 2:  ·  Problem:  Sepsis.  Plan: Present on admission.     Likely sec to Viral infection. IVF ,Antiviral .      Problem/Plan - 3:  ·  Problem: Pneumonia.  Plan: Sec to COVID .      Problem/Plan - 4:  ·  Problem: COVID-19.  Plan: Started on Dexamethasone and Remidisvir . ID help appreciated.   Monitoring inflammatory markers.   CRP trending down.      Problem/Plan - 5:  ·  Problem: Lactic acid acidosis.  Plan: IVF. Trending down.      Problem/Plan - 6:  Problem: CAD (coronary artery disease). Plan: S/P stent in oct 2019 . DAPT .     Problem/Plan - 7:  ·  Problem: Diabetes with Hyperglycemia .  Plan: Sugars high . On Steroid. SSI and increased  Lantus . Will start  Pre meal .      Problem/Plan - 8:  ·  Problem: HLD (hyperlipidemia).  Plan: Holding as on Remidisvir and watching LFT .      Problem/Plan - 9:  ·  Problem: Diarrhea.  Plan: Resolved.

## 2020-12-10 NOTE — PROGRESS NOTE ADULT - SUBJECTIVE AND OBJECTIVE BOX
INTERVAL HPI/OVERNIGHT EVENTS: I feel fine.   Vital Signs Last 24 Hrs  T(C): 36.8 (10 Dec 2020 12:48), Max: 36.9 (09 Dec 2020 21:46)  T(F): 98.3 (10 Dec 2020 12:48), Max: 98.5 (09 Dec 2020 21:46)  HR: 70 (10 Dec 2020 16:07) (58 - 75)  BP: 120/69 (10 Dec 2020 12:48) (120/69 - 137/74)  BP(mean): --  RR: 20 (10 Dec 2020 16:07) (20 - 20)  SpO2: 96% (10 Dec 2020 16:07) (94% - 100%)  I&O's Summary    09 Dec 2020 07:01  -  10 Dec 2020 07:00  --------------------------------------------------------  IN: 250 mL / OUT: 1925 mL / NET: -1675 mL    10 Dec 2020 07:01  -  10 Dec 2020 18:31  --------------------------------------------------------  IN: 0 mL / OUT: 475 mL / NET: -475 mL      MEDICATIONS  (STANDING):  ALBUTerol    90 MICROgram(s) HFA Inhaler 2 Puff(s) Inhalation every 6 hours  aspirin enteric coated 81 milliGRAM(s) Oral daily  benzonatate 100 milliGRAM(s) Oral three times a day  clopidogrel Tablet 75 milliGRAM(s) Oral daily  dexAMETHasone  Injectable 6 milliGRAM(s) IV Push daily  dextrose 40% Gel 15 Gram(s) Oral once  dextrose 5%. 1000 milliLiter(s) (50 mL/Hr) IV Continuous <Continuous>  dextrose 5%. 1000 milliLiter(s) (100 mL/Hr) IV Continuous <Continuous>  dextrose 50% Injectable 25 Gram(s) IV Push once  dextrose 50% Injectable 12.5 Gram(s) IV Push once  dextrose 50% Injectable 25 Gram(s) IV Push once  enoxaparin Injectable 40 milliGRAM(s) SubCutaneous every 12 hours  glucagon  Injectable 1 milliGRAM(s) IntraMuscular once  influenza   Vaccine 0.5 milliLiter(s) IntraMuscular once  insulin glargine Injectable (LANTUS) 35 Unit(s) SubCutaneous at bedtime  insulin lispro (ADMELOG) corrective regimen sliding scale   SubCutaneous three times a day before meals  insulin lispro (ADMELOG) corrective regimen sliding scale   SubCutaneous at bedtime  insulin lispro Injectable (ADMELOG) 4 Unit(s) SubCutaneous two times a day with meals  multivitamin 1 Tablet(s) Oral daily    MEDICATIONS  (PRN):  acetaminophen   Tablet .. 650 milliGRAM(s) Oral every 4 hours PRN Temp greater or equal to 38.5C (101.3F)    LABS:                        14.8   9.51  )-----------( 334      ( 10 Dec 2020 08:36 )             44.8     12-10    139  |  103  |  20  ----------------------------<  176<H>  4.4   |  23  |  0.75    Ca    9.2      10 Dec 2020 08:36    TPro  6.3  /  Alb  3.0<L>  /  TBili  0.4  /  DBili  x   /  AST  23  /  ALT  42  /  AlkPhos  48  12-10        CAPILLARY BLOOD GLUCOSE      POCT Blood Glucose.: 268 mg/dL (10 Dec 2020 16:39)  POCT Blood Glucose.: 252 mg/dL (10 Dec 2020 11:56)  POCT Blood Glucose.: 196 mg/dL (10 Dec 2020 08:42)  POCT Blood Glucose.: 165 mg/dL (09 Dec 2020 21:45)          REVIEW OF SYSTEMS:  CONSTITUTIONAL: No fever, weight loss, or fatigue  EYES: No eye pain, visual disturbances, or discharge  ENMT:  No difficulty hearing, tinnitus, vertigo; No sinus or throat pain  NECK: No pain or stiffness  RESPIRATORY: No cough, wheezing, chills or hemoptysis; No shortness of breath  CARDIOVASCULAR: No chest pain, palpitations, dizziness, or leg swelling  GASTROINTESTINAL: No abdominal or epigastric pain. No nausea, vomiting, or hematemesis; No diarrhea or constipation. No melena or hematochezia.  GENITOURINARY: No dysuria, frequency, hematuria, or incontinence  NEUROLOGICAL: No headaches, memory loss, loss of strength, numbness, or tremors  SKIN: No itching, burning, rashes, or lesions   ENDOCRINE: No heat or cold intolerance; No hair loss  MUSCULOSKELETAL: No joint pain or swelling; No muscle, back, or extremity pain  PSYCHIATRIC: No depression, anxiety, mood swings, or difficulty sleeping  HEME/LYMPH: No easy bruising, or bleeding gums  ALLERY AND IMMUNOLOGIC: No hives or eczema    RADIOLOGY & ADDITIONAL TESTS:    Consultant(s) Notes Reviewed:  [x ] YES  [ ] NO    PHYSICAL EXAM:  GENERAL: NAD, NC HF oxygen   HEAD:  Atraumatic, Normocephalic  EYES: EOMI, PERRLA, conjunctiva and sclera clear  ENMT: No tonsillar erythema, exudates, or enlargement; Moist mucous membranes, Good dentition, No lesions  NECK: Supple, No JVD, Normal thyroid  NERVOUS SYSTEM:  Alert & Oriented X3, No focal deficit   CHEST/LUNG: Good air entry bilateral with no  rales, rhonchi, wheezing, or rubs  HEART: Regular rate and rhythm; No murmurs, rubs, or gallops  ABDOMEN: Soft, Nontender, Nondistended; Bowel sounds present  EXTREMITIES:  2+ Peripheral Pulses, No clubbing, cyanosis, or edema  SKIN: No rashes or lesions    Care Discussed with Consultants/Other Providers [ x] YES  [ ] NO

## 2020-12-10 NOTE — PROGRESS NOTE ADULT - SUBJECTIVE AND OBJECTIVE BOX
CC: F/U for COVID    Saw/spoke to patient. No fevers, no chills. No new complaints.    Allergies  No Known Allergies    ANTIMICROBIALS:  S/p remdisivir    PE:    Vital Signs Last 24 Hrs  T(C): 36.8 (10 Dec 2020 12:48), Max: 36.9 (09 Dec 2020 21:46)  T(F): 98.3 (10 Dec 2020 12:48), Max: 98.5 (09 Dec 2020 21:46)  HR: 69 (10 Dec 2020 12:48) (58 - 75)  BP: 120/69 (10 Dec 2020 12:48) (120/69 - 137/74)  RR: 20 (10 Dec 2020 12:48) (18 - 20)  SpO2: 96% (10 Dec 2020 12:48) (94% - 100%)    Gen: AOx3, NAD, non-toxic  CV: S1+S2 normal, nontachycardic  Resp: Clear bilat, no resp distress, no crackles/wheezes, nasal canula, high flow  Abd: Soft, nontender, +BS  Ext: No LE edema, no wounds    LABS:                        14.8   9.51  )-----------( 334      ( 10 Dec 2020 08:36 )             44.8     12-10    139  |  103  |  20  ----------------------------<  176<H>  4.4   |  23  |  0.75    Ca    9.2      10 Dec 2020 08:36    TPro  6.3  /  Alb  3.0<L>  /  TBili  0.4  /  DBili  x   /  AST  23  /  ALT  42  /  AlkPhos  48  12-10    MICROBIOLOGY:    COVID+    RADIOLOGY:    12/5 XR:    IMPRESSION:  Questionable vague increased right basilar markings indeterminate for atelectatic changes or evolving infectious process including from potential atypical viral etiologies, chest CT may be helpful for more definitive evaluation.

## 2020-12-10 NOTE — PROGRESS NOTE ADULT - ASSESSMENT
51 yo M hx CAD s/p 1x stent, COVID+, c/o worsening SOB  Fever, no leukocytosis  COVID+  CXR consistent with COVID  Symptoms started around Thanksgiving  Hypoxic on high flow NC, improving  Overall,  1) COVID  - Symptoms started < 14 days ago, patient on high O2 supplementation  - Dex 6mg q 24, 10 day course then discontinue  - S/p course remdisivir  - No signs secondary bacterial infection presently  2) SOB  - Any role for PE workup/anticoagulation per primary team  - O2 supplementation per primary team    Ethan Sanders MD  Pager [phone]  After 5pm and on weekends call [phone]

## 2020-12-10 NOTE — PROGRESS NOTE ADULT - ASSESSMENT
49 yo M hx CAD s/p 1x stent, COVID+, c/o worsening SOB and hypoxia. Pt states he tested positive for COVID the day before Thanksgiving. This is also the time the pt's symptoms of myalgias, fatigue, cough, and fevers started, which have worsened over time.

## 2020-12-11 LAB
ALBUMIN SERPL ELPH-MCNC: 3.4 G/DL — SIGNIFICANT CHANGE UP (ref 3.3–5)
ALBUMIN SERPL ELPH-MCNC: 3.4 G/DL — SIGNIFICANT CHANGE UP (ref 3.3–5)
ALP SERPL-CCNC: 50 U/L — SIGNIFICANT CHANGE UP (ref 40–120)
ALP SERPL-CCNC: 51 U/L — SIGNIFICANT CHANGE UP (ref 40–120)
ALT FLD-CCNC: 41 U/L — SIGNIFICANT CHANGE UP (ref 10–45)
ALT FLD-CCNC: 42 U/L — SIGNIFICANT CHANGE UP (ref 10–45)
ANION GAP SERPL CALC-SCNC: 15 MMOL/L — SIGNIFICANT CHANGE UP (ref 5–17)
AST SERPL-CCNC: 21 U/L — SIGNIFICANT CHANGE UP (ref 10–40)
AST SERPL-CCNC: 29 U/L — SIGNIFICANT CHANGE UP (ref 10–40)
BASOPHILS # BLD AUTO: 0.04 K/UL — SIGNIFICANT CHANGE UP (ref 0–0.2)
BASOPHILS NFR BLD AUTO: 0.4 % — SIGNIFICANT CHANGE UP (ref 0–2)
BILIRUB DIRECT SERPL-MCNC: <0.1 MG/DL — SIGNIFICANT CHANGE UP (ref 0–0.2)
BILIRUB INDIRECT FLD-MCNC: >0.3 MG/DL — SIGNIFICANT CHANGE UP (ref 0.2–1)
BILIRUB SERPL-MCNC: 0.4 MG/DL — SIGNIFICANT CHANGE UP (ref 0.2–1.2)
BILIRUB SERPL-MCNC: 0.4 MG/DL — SIGNIFICANT CHANGE UP (ref 0.2–1.2)
BUN SERPL-MCNC: 20 MG/DL — SIGNIFICANT CHANGE UP (ref 7–23)
CALCIUM SERPL-MCNC: 9.5 MG/DL — SIGNIFICANT CHANGE UP (ref 8.4–10.5)
CHLORIDE SERPL-SCNC: 98 MMOL/L — SIGNIFICANT CHANGE UP (ref 96–108)
CO2 SERPL-SCNC: 22 MMOL/L — SIGNIFICANT CHANGE UP (ref 22–31)
CREAT SERPL-MCNC: 0.68 MG/DL — SIGNIFICANT CHANGE UP (ref 0.5–1.3)
CREAT SERPL-MCNC: 0.71 MG/DL — SIGNIFICANT CHANGE UP (ref 0.5–1.3)
D DIMER BLD IA.RAPID-MCNC: 225 NG/ML DDU — SIGNIFICANT CHANGE UP
EOSINOPHIL # BLD AUTO: 0.11 K/UL — SIGNIFICANT CHANGE UP (ref 0–0.5)
EOSINOPHIL NFR BLD AUTO: 1.1 % — SIGNIFICANT CHANGE UP (ref 0–6)
GLUCOSE BLDC GLUCOMTR-MCNC: 174 MG/DL — HIGH (ref 70–99)
GLUCOSE BLDC GLUCOMTR-MCNC: 255 MG/DL — HIGH (ref 70–99)
GLUCOSE BLDC GLUCOMTR-MCNC: 263 MG/DL — HIGH (ref 70–99)
GLUCOSE BLDC GLUCOMTR-MCNC: 301 MG/DL — HIGH (ref 70–99)
GLUCOSE SERPL-MCNC: 280 MG/DL — HIGH (ref 70–99)
HCT VFR BLD CALC: 42.3 % — SIGNIFICANT CHANGE UP (ref 39–50)
HGB BLD-MCNC: 14.5 G/DL — SIGNIFICANT CHANGE UP (ref 13–17)
IMM GRANULOCYTES NFR BLD AUTO: 2.7 % — HIGH (ref 0–1.5)
LYMPHOCYTES # BLD AUTO: 1.36 K/UL — SIGNIFICANT CHANGE UP (ref 1–3.3)
LYMPHOCYTES # BLD AUTO: 13.5 % — SIGNIFICANT CHANGE UP (ref 13–44)
MCHC RBC-ENTMCNC: 30.3 PG — SIGNIFICANT CHANGE UP (ref 27–34)
MCHC RBC-ENTMCNC: 34.3 GM/DL — SIGNIFICANT CHANGE UP (ref 32–36)
MCV RBC AUTO: 88.5 FL — SIGNIFICANT CHANGE UP (ref 80–100)
MONOCYTES # BLD AUTO: 0.28 K/UL — SIGNIFICANT CHANGE UP (ref 0–0.9)
MONOCYTES NFR BLD AUTO: 2.8 % — SIGNIFICANT CHANGE UP (ref 2–14)
NEUTROPHILS # BLD AUTO: 8 K/UL — HIGH (ref 1.8–7.4)
NEUTROPHILS NFR BLD AUTO: 79.5 % — HIGH (ref 43–77)
NRBC # BLD: 0 /100 WBCS — SIGNIFICANT CHANGE UP (ref 0–0)
PLATELET # BLD AUTO: 447 K/UL — HIGH (ref 150–400)
POTASSIUM SERPL-MCNC: 4.5 MMOL/L — SIGNIFICANT CHANGE UP (ref 3.5–5.3)
POTASSIUM SERPL-SCNC: 4.5 MMOL/L — SIGNIFICANT CHANGE UP (ref 3.5–5.3)
PROT SERPL-MCNC: 6.9 G/DL — SIGNIFICANT CHANGE UP (ref 6–8.3)
PROT SERPL-MCNC: 6.9 G/DL — SIGNIFICANT CHANGE UP (ref 6–8.3)
RBC # BLD: 4.78 M/UL — SIGNIFICANT CHANGE UP (ref 4.2–5.8)
RBC # FLD: 11.9 % — SIGNIFICANT CHANGE UP (ref 10.3–14.5)
SODIUM SERPL-SCNC: 135 MMOL/L — SIGNIFICANT CHANGE UP (ref 135–145)
WBC # BLD: 10.06 K/UL — SIGNIFICANT CHANGE UP (ref 3.8–10.5)
WBC # FLD AUTO: 10.06 K/UL — SIGNIFICANT CHANGE UP (ref 3.8–10.5)

## 2020-12-11 PROCEDURE — 99232 SBSQ HOSP IP/OBS MODERATE 35: CPT

## 2020-12-11 RX ORDER — INSULIN LISPRO 100/ML
7 VIAL (ML) SUBCUTANEOUS
Refills: 0 | Status: DISCONTINUED | OUTPATIENT
Start: 2020-12-11 | End: 2020-12-14

## 2020-12-11 RX ADMIN — ENOXAPARIN SODIUM 40 MILLIGRAM(S): 100 INJECTION SUBCUTANEOUS at 06:09

## 2020-12-11 RX ADMIN — Medication 4 UNIT(S): at 07:57

## 2020-12-11 RX ADMIN — ENOXAPARIN SODIUM 40 MILLIGRAM(S): 100 INJECTION SUBCUTANEOUS at 17:01

## 2020-12-11 RX ADMIN — Medication 4: at 07:58

## 2020-12-11 RX ADMIN — ALBUTEROL 2 PUFF(S): 90 AEROSOL, METERED ORAL at 06:09

## 2020-12-11 RX ADMIN — Medication 6 MILLIGRAM(S): at 06:08

## 2020-12-11 RX ADMIN — Medication 1 TABLET(S): at 11:35

## 2020-12-11 RX ADMIN — Medication 81 MILLIGRAM(S): at 11:35

## 2020-12-11 RX ADMIN — Medication 100 MILLIGRAM(S): at 22:26

## 2020-12-11 RX ADMIN — ALBUTEROL 2 PUFF(S): 90 AEROSOL, METERED ORAL at 11:35

## 2020-12-11 RX ADMIN — Medication 16: at 11:35

## 2020-12-11 RX ADMIN — ALBUTEROL 2 PUFF(S): 90 AEROSOL, METERED ORAL at 17:00

## 2020-12-11 RX ADMIN — Medication 100 MILLIGRAM(S): at 13:06

## 2020-12-11 RX ADMIN — INSULIN GLARGINE 35 UNIT(S): 100 INJECTION, SOLUTION SUBCUTANEOUS at 22:26

## 2020-12-11 RX ADMIN — Medication 4 UNIT(S): at 17:00

## 2020-12-11 RX ADMIN — Medication 100 MILLIGRAM(S): at 06:08

## 2020-12-11 RX ADMIN — Medication 12: at 17:00

## 2020-12-11 RX ADMIN — CLOPIDOGREL BISULFATE 75 MILLIGRAM(S): 75 TABLET, FILM COATED ORAL at 11:35

## 2020-12-11 RX ADMIN — Medication 1: at 22:26

## 2020-12-11 NOTE — PROGRESS NOTE ADULT - PROBLEM SELECTOR PROBLEM 1
Pneumonia due to COVID-19 virus [de-identified] : Consent: \par At this time, I have recommended an injection to the right knee.  The risks and benefits of the procedure were discussed with the patient in detail.  Upon verbal consent of the patient, we proceeded with the injection as noted below.  \par \par Procedure:  \par After a sterile prep, the patient underwent an injection of 9 cc of 1% Lidocaine without epinephrine and 1 cc of Kenalog into the right knee.  The patient tolerated the procedure well.  There were no complications.  \par \par

## 2020-12-11 NOTE — PROGRESS NOTE ADULT - PROBLEM SELECTOR PLAN 2
2nd to COVID PNA  -CXR 12/5 bilateral patchy opacities   -Hypoxia continues to improve  -Ddimer <500  -Decrease high flow to 30L/40%. If sats >92% on current settings, can try 5-6LNC. Continue to wean supplemental O2 as tolerated, keep sats >90%.

## 2020-12-11 NOTE — CHART NOTE - NSCHARTNOTEFT_GEN_A_CORE
Nutrition Follow Up Note    Patient seen for malnutrition follow up    Source: pt, EMR. Unable to conduct a face-to-face interview at this time due to limited contact restrictions related to pt's medical condition and isolation precautions. Information obtained from pt via room phone call.    Chart reviewed, events noted. Per chart, pt is a 51 yo M hx CAD s/p 1x stent, COVID+, c/o worsening SOB. Pt now on HF NC oxygen. Pt w/ hx of pre-DM, now with recent A1c 10.8.    Diet: Diet, Consistent Carbohydrate w/Evening Snack (12-05-20 @ 20:37)    Patient reports no N/V, last BM 12/10.    PO intake: Pt reports fair appetite but good PO intake, states he knows he has to eat and is eating as a result. Reports typically consuming 100% of breakfast tray and 50-75% of lunch and dinner trays. Continues to drink diet Mighty Shakes.    Source for PO intake: pt, EMR    Enteral/Parenteral Nutrition: N/A    no updated weights available at this time, will continue to monitor weights for changes if any     Pertinent Medications: MEDICATIONS  (STANDING):  ALBUTerol    90 MICROgram(s) HFA Inhaler 2 Puff(s) Inhalation every 6 hours  aspirin enteric coated 81 milliGRAM(s) Oral daily  benzonatate 100 milliGRAM(s) Oral three times a day  clopidogrel Tablet 75 milliGRAM(s) Oral daily  dexAMETHasone  Injectable 6 milliGRAM(s) IV Push daily  dextrose 40% Gel 15 Gram(s) Oral once  dextrose 5%. 1000 milliLiter(s) (50 mL/Hr) IV Continuous <Continuous>  dextrose 5%. 1000 milliLiter(s) (100 mL/Hr) IV Continuous <Continuous>  dextrose 50% Injectable 25 Gram(s) IV Push once  dextrose 50% Injectable 12.5 Gram(s) IV Push once  dextrose 50% Injectable 25 Gram(s) IV Push once  enoxaparin Injectable 40 milliGRAM(s) SubCutaneous every 12 hours  glucagon  Injectable 1 milliGRAM(s) IntraMuscular once  influenza   Vaccine 0.5 milliLiter(s) IntraMuscular once  insulin glargine Injectable (LANTUS) 35 Unit(s) SubCutaneous at bedtime  insulin lispro (ADMELOG) corrective regimen sliding scale   SubCutaneous at bedtime  insulin lispro (ADMELOG) corrective regimen sliding scale   SubCutaneous three times a day before meals  insulin lispro Injectable (ADMELOG) 4 Unit(s) SubCutaneous two times a day with meals  multivitamin 1 Tablet(s) Oral daily    MEDICATIONS  (PRN):  acetaminophen   Tablet .. 650 milliGRAM(s) Oral every 4 hours PRN Temp greater or equal to 38.5C (101.3F)    Pertinent Labs: 12-11 @ 09:57: Na 135, BUN 20, Cr 0.71, <H>, K+ 4.5, Phos --, Mg --, Alk Phos 51, ALT/SGPT 41, AST/SGOT 21, HbA1c --    Finger Sticks:  POCT Blood Glucose.: 301 mg/dL (12-11 @ 11:28)  POCT Blood Glucose.: 174 mg/dL (12-11 @ 07:54)  POCT Blood Glucose.: 176 mg/dL (12-10 @ 21:40)  POCT Blood Glucose.: 268 mg/dL (12-10 @ 16:39)      Skin per nursing documentation: no pressure injuries noted  Edema per nursing documentation: +1 generalized     Estimated Needs:   [x] no change since previous assessment  [ ] recalculated:     Previous Nutrition Diagnosis: Severe malnutrition, Altered Nutrition Related Lab Values   Nutrition Diagnosis is: ongoing, care plan in progress, being addressed with PO encouragement, ongoing diet education    New Nutrition Diagnosis:  Related to:   As evidenced by:     Interventions:     Recommend  1.    Monitoring and Evaluation:     Continue to monitor nutritional intake, tolerance to diet prescription, weights, labs, skin integrity    RD remains available upon request and will follow up per protocol    Vivian Cardenas RD, Pager # [phone] Nutrition Follow Up Note    Patient seen for malnutrition follow up    Source: pt, EMR. Unable to conduct a face-to-face interview at this time due to limited contact restrictions related to pt's medical condition and isolation precautions. Information obtained from pt via room phone call.    Chart reviewed, events noted. Per chart, pt is a 51 yo M hx CAD s/p 1x stent, COVID+, c/o worsening SOB. Pt now on HF NC oxygen. Pt w/ hx of pre-DM, now with recent A1c 10.8.    Diet: Diet, Consistent Carbohydrate w/Evening Snack (12-05-20 @ 20:37)    Patient reports no N/V, last BM 12/10.    PO intake: Pt reports fair appetite but good PO intake, states he knows he has to eat and is eating as a result. Reports typically consuming 100% of breakfast tray and 50-75% of lunch and dinner trays. Continues to drink diet Mighty Shakes.    Source for PO intake: pt, EMR    Enteral/Parenteral Nutrition: N/A    no updated weights available at this time, will continue to monitor weights for changes if any     Pertinent Medications: MEDICATIONS  (STANDING):  ALBUTerol    90 MICROgram(s) HFA Inhaler 2 Puff(s) Inhalation every 6 hours  aspirin enteric coated 81 milliGRAM(s) Oral daily  benzonatate 100 milliGRAM(s) Oral three times a day  clopidogrel Tablet 75 milliGRAM(s) Oral daily  dexAMETHasone  Injectable 6 milliGRAM(s) IV Push daily  dextrose 40% Gel 15 Gram(s) Oral once  dextrose 5%. 1000 milliLiter(s) (50 mL/Hr) IV Continuous <Continuous>  dextrose 5%. 1000 milliLiter(s) (100 mL/Hr) IV Continuous <Continuous>  dextrose 50% Injectable 25 Gram(s) IV Push once  dextrose 50% Injectable 12.5 Gram(s) IV Push once  dextrose 50% Injectable 25 Gram(s) IV Push once  enoxaparin Injectable 40 milliGRAM(s) SubCutaneous every 12 hours  glucagon  Injectable 1 milliGRAM(s) IntraMuscular once  influenza   Vaccine 0.5 milliLiter(s) IntraMuscular once  insulin glargine Injectable (LANTUS) 35 Unit(s) SubCutaneous at bedtime  insulin lispro (ADMELOG) corrective regimen sliding scale   SubCutaneous at bedtime  insulin lispro (ADMELOG) corrective regimen sliding scale   SubCutaneous three times a day before meals  insulin lispro Injectable (ADMELOG) 4 Unit(s) SubCutaneous two times a day with meals  multivitamin 1 Tablet(s) Oral daily    MEDICATIONS  (PRN):  acetaminophen   Tablet .. 650 milliGRAM(s) Oral every 4 hours PRN Temp greater or equal to 38.5C (101.3F)    Pertinent Labs: 12-11 @ 09:57: Na 135, BUN 20, Cr 0.71, <H>, K+ 4.5, Phos --, Mg --, Alk Phos 51, ALT/SGPT 41, AST/SGOT 21, HbA1c --    Finger Sticks:  POCT Blood Glucose.: 301 mg/dL (12-11 @ 11:28)  POCT Blood Glucose.: 174 mg/dL (12-11 @ 07:54)  POCT Blood Glucose.: 176 mg/dL (12-10 @ 21:40)  POCT Blood Glucose.: 268 mg/dL (12-10 @ 16:39)    Skin per nursing documentation: no pressure injuries noted  Edema per nursing documentation: +1 generalized     Estimated Needs:   [x] no change since previous assessment  [ ] recalculated:     Previous Nutrition Diagnosis: Severe malnutrition, Altered Nutrition Related Lab Values   Nutrition Diagnosis is: ongoing, care plan in progress, being addressed with PO encouragement, ongoing diet education    New Nutrition Diagnosis: N/A    Interventions: Provided education on T2DM nutrition therapy. Provided education on foods containing carbohydrates, foods containing proteins, and portion sizes. Stressed the importance of a balanced meal to maintain blood glucose. Encouraged vegetables consumption. Discussed how to manage hypoglycemia. Recommended water consumption with avoidance of soda and juice. Pt amenable to handouts, RD to provide to RN to give to pt 2/2 isolation protocols.     Recommend  1. Continue consistent carbohydrate diet with evening snack.  2. RD to continue to provide diet Mighty Shakes 3 x daily   3. Provide reinforcement diet education as needed.  4. Consider endocrine consult given HbA1c of 10.1% - pt only on Metformin PTA    Monitoring and Evaluation:     Continue to monitor nutritional intake, tolerance to diet prescription, weights, labs, skin integrity    RD remains available upon request and will follow up per protocol    Vivian Cardenas RD, Pager # [phone]

## 2020-12-11 NOTE — PROGRESS NOTE ADULT - SUBJECTIVE AND OBJECTIVE BOX
CC: F/U for COVID    Saw/spoke to patient. No fevers, no chills. No new complaints.    Allergies  No Known Allergies    ANTIMICROBIALS:  Off    PE:    Vital Signs Last 24 Hrs  T(C): 36.8 (11 Dec 2020 12:44), Max: 36.8 (10 Dec 2020 12:48)  T(F): 98.3 (11 Dec 2020 12:44), Max: 98.3 (10 Dec 2020 12:48)  HR: 71 (11 Dec 2020 12:44) (67 - 81)  BP: 115/71 (11 Dec 2020 12:44) (107/56 - 120/69)  RR: 20 (11 Dec 2020 12:44) (20 - 20)  SpO2: 96% (11 Dec 2020 12:44) (94% - 98%)    Gen: AOx3, NAD, non-toxic  Resp: High flow NC    LABS:                        14.5   10.06 )-----------( 447      ( 11 Dec 2020 09:57 )             42.3     12-11    135  |  98  |  20  ----------------------------<  280<H>  4.5   |  22  |  0.71    Ca    9.5      11 Dec 2020 09:57    TPro  6.9  /  Alb  3.4  /  TBili  0.4  /  DBili  <0.1  /  AST  21  /  ALT  41  /  AlkPhos  51  12-11    MICROBIOLOGY:    12/5 COVID+    RADIOLOGY:    12/5 XR:    IMPRESSION:  Questionable vague increased right basilar markings indeterminate for atelectatic changes or evolving infectious process including from potential atypical viral etiologies, chest CT may be helpful for more definitive evaluation.

## 2020-12-11 NOTE — PROGRESS NOTE ADULT - SUBJECTIVE AND OBJECTIVE BOX
INTERVAL HPI/OVERNIGHT EVENTS: I feel fine.  Vital Signs Last 24 Hrs  T(C): 36.8 (11 Dec 2020 12:44), Max: 36.8 (10 Dec 2020 21:37)  T(F): 98.3 (11 Dec 2020 12:44), Max: 98.3 (10 Dec 2020 21:37)  HR: 85 (11 Dec 2020 15:35) (67 - 85)  BP: 115/71 (11 Dec 2020 12:44) (107/56 - 115/71)  BP(mean): --  RR: 20 (11 Dec 2020 15:35) (20 - 20)  SpO2: 95% (11 Dec 2020 15:35) (94% - 98%)  I&O's Summary    10 Dec 2020 07:01  -  11 Dec 2020 07:00  --------------------------------------------------------  IN: 480 mL / OUT: 1675 mL / NET: -1195 mL    11 Dec 2020 07:01  -  11 Dec 2020 19:04  --------------------------------------------------------  IN: 240 mL / OUT: 550 mL / NET: -310 mL      MEDICATIONS  (STANDING):  ALBUTerol    90 MICROgram(s) HFA Inhaler 2 Puff(s) Inhalation every 6 hours  aspirin enteric coated 81 milliGRAM(s) Oral daily  benzonatate 100 milliGRAM(s) Oral three times a day  clopidogrel Tablet 75 milliGRAM(s) Oral daily  dexAMETHasone  Injectable 6 milliGRAM(s) IV Push daily  dextrose 40% Gel 15 Gram(s) Oral once  dextrose 5%. 1000 milliLiter(s) (50 mL/Hr) IV Continuous <Continuous>  dextrose 5%. 1000 milliLiter(s) (100 mL/Hr) IV Continuous <Continuous>  dextrose 50% Injectable 25 Gram(s) IV Push once  dextrose 50% Injectable 12.5 Gram(s) IV Push once  dextrose 50% Injectable 25 Gram(s) IV Push once  enoxaparin Injectable 40 milliGRAM(s) SubCutaneous every 12 hours  glucagon  Injectable 1 milliGRAM(s) IntraMuscular once  influenza   Vaccine 0.5 milliLiter(s) IntraMuscular once  insulin glargine Injectable (LANTUS) 35 Unit(s) SubCutaneous at bedtime  insulin lispro (ADMELOG) corrective regimen sliding scale   SubCutaneous three times a day before meals  insulin lispro (ADMELOG) corrective regimen sliding scale   SubCutaneous at bedtime  insulin lispro Injectable (ADMELOG) 4 Unit(s) SubCutaneous two times a day with meals  multivitamin 1 Tablet(s) Oral daily    MEDICATIONS  (PRN):  acetaminophen   Tablet .. 650 milliGRAM(s) Oral every 4 hours PRN Temp greater or equal to 38.5C (101.3F)    LABS:                        14.5   10.06 )-----------( 447      ( 11 Dec 2020 09:57 )             42.3     12-11    135  |  98  |  20  ----------------------------<  280<H>  4.5   |  22  |  0.71    Ca    9.5      11 Dec 2020 09:57    TPro  6.9  /  Alb  3.4  /  TBili  0.4  /  DBili  <0.1  /  AST  21  /  ALT  41  /  AlkPhos  51  12-11        CAPILLARY BLOOD GLUCOSE      POCT Blood Glucose.: 263 mg/dL (11 Dec 2020 16:36)  POCT Blood Glucose.: 301 mg/dL (11 Dec 2020 11:28)  POCT Blood Glucose.: 174 mg/dL (11 Dec 2020 07:54)  POCT Blood Glucose.: 176 mg/dL (10 Dec 2020 21:40)          REVIEW OF SYSTEMS:  CONSTITUTIONAL: No fever, weight loss, or fatigue  EYES: No eye pain, visual disturbances, or discharge  ENMT:  No difficulty hearing, tinnitus, vertigo; No sinus or throat pain  NECK: No pain or stiffness  RESPIRATORY: No cough, wheezing, chills or hemoptysis; No shortness of breath  CARDIOVASCULAR: No chest pain, palpitations, dizziness, or leg swelling  GASTROINTESTINAL: No abdominal or epigastric pain. No nausea, vomiting, or hematemesis; No diarrhea or constipation. No melena or hematochezia.  GENITOURINARY: No dysuria, frequency, hematuria, or incontinence  NEUROLOGICAL: No headaches, memory loss, loss of strength, numbness, or tremors      Consultant(s) Notes Reviewed:  [x ] YES  [ ] NO    PHYSICAL EXAM:  GENERAL: NAD, Oxygen   HEAD:  Atraumatic, Normocephalic  EYES: EOMI, PERRLA, conjunctiva and sclera clear  ENMT: No tonsillar erythema, exudates, or enlargement; Moist mucous membranes, Good dentition, No lesions  NECK: Supple, No JVD, Normal thyroid  NERVOUS SYSTEM:  Alert & Oriented X3, No focal deficit   CHEST/LUNG: Good air entry bilateral with no  rales, rhonchi, wheezing, or rubs  HEART: Regular rate and rhythm; No murmurs, rubs, or gallops  ABDOMEN: Soft, Nontender, Nondistended; Bowel sounds present  EXTREMITIES:  2+ Peripheral Pulses, No clubbing, cyanosis, or edema    Care Discussed with Consultants/Other Providers [ x] YES  [ ] NO

## 2020-12-11 NOTE — PROGRESS NOTE ADULT - ASSESSMENT
49 yo M hx CAD s/p 1x stent, COVID+, c/o worsening SOB  Fever, no leukocytosis  COVID+  CXR consistent with COVID  Symptoms started around Thanksgiving  Hypoxic on high flow NC, improving  Overall,  1) COVID  - Symptoms started < 14 days ago, patient on high O2 supplementation  - Dex 6mg q 24, 10 day course then discontinue  - S/p course remdisivir  - No signs secondary bacterial infection presently  2) SOB  - Any role for PE workup/anticoagulation per primary team  - O2 supplementation per primary team    Ethan Sanders MD  Pager [phone]  After 5pm and on weekends call [phone]

## 2020-12-11 NOTE — PROGRESS NOTE ADULT - SUBJECTIVE AND OBJECTIVE BOX
Follow-up Pulm Progress Note    Feeling good  OOB to chair  O2 lowered to 30L/40% high flow NC  cough improving     Medications:  MEDICATIONS  (STANDING):  ALBUTerol    90 MICROgram(s) HFA Inhaler 2 Puff(s) Inhalation every 6 hours  aspirin enteric coated 81 milliGRAM(s) Oral daily  benzonatate 100 milliGRAM(s) Oral three times a day  clopidogrel Tablet 75 milliGRAM(s) Oral daily  dexAMETHasone  Injectable 6 milliGRAM(s) IV Push daily  dextrose 40% Gel 15 Gram(s) Oral once  dextrose 5%. 1000 milliLiter(s) (50 mL/Hr) IV Continuous <Continuous>  dextrose 5%. 1000 milliLiter(s) (100 mL/Hr) IV Continuous <Continuous>  dextrose 50% Injectable 25 Gram(s) IV Push once  dextrose 50% Injectable 12.5 Gram(s) IV Push once  dextrose 50% Injectable 25 Gram(s) IV Push once  enoxaparin Injectable 40 milliGRAM(s) SubCutaneous every 12 hours  glucagon  Injectable 1 milliGRAM(s) IntraMuscular once  influenza   Vaccine 0.5 milliLiter(s) IntraMuscular once  insulin glargine Injectable (LANTUS) 35 Unit(s) SubCutaneous at bedtime  insulin lispro (ADMELOG) corrective regimen sliding scale   SubCutaneous three times a day before meals  insulin lispro (ADMELOG) corrective regimen sliding scale   SubCutaneous at bedtime  insulin lispro Injectable (ADMELOG) 4 Unit(s) SubCutaneous two times a day with meals  multivitamin 1 Tablet(s) Oral daily    MEDICATIONS  (PRN):  acetaminophen   Tablet .. 650 milliGRAM(s) Oral every 4 hours PRN Temp greater or equal to 38.5C (101.3F)          Vital Signs Last 24 Hrs  T(C): 36.8 (11 Dec 2020 04:31), Max: 36.8 (10 Dec 2020 12:48)  T(F): 98.2 (11 Dec 2020 04:31), Max: 98.3 (10 Dec 2020 12:48)  HR: 75 (11 Dec 2020 08:55) (67 - 75)  BP: 107/56 (11 Dec 2020 04:31) (107/56 - 120/69)  BP(mean): --  RR: 20 (11 Dec 2020 08:55) (20 - 20)  SpO2: 95% (11 Dec 2020 08:55) (95% - 98%)          12-10 @ 07:01  -  12-11 @ 07:00  --------------------------------------------------------  IN: 480 mL / OUT: 1675 mL / NET: -1195 mL          LABS:                        14.5   10.06 )-----------( 447      ( 11 Dec 2020 09:57 )             42.3     12-11    135  |  98  |  20  ----------------------------<  280<H>  4.5   |  22  |  0.71    Ca    9.5      11 Dec 2020 09:57    TPro  6.9  /  Alb  3.4  /  TBili  0.4  /  DBili  <0.1  /  AST  21  /  ALT  41  /  AlkPhos  51  12-11          CAPILLARY BLOOD GLUCOSE      POCT Blood Glucose.: 174 mg/dL (11 Dec 2020 07:54)      Physical Examination:  PULM: decreased BS  CVS: RRR    RADIOLOGY REVIEWED  CXR 12/5: bilateral opacities

## 2020-12-11 NOTE — PROGRESS NOTE ADULT - ASSESSMENT
49 yo M hx CAD s/p 1x stent, COVID+, c/o worsening SOB. Pt states he tested positive for COVID the day before Thanksgiving 10 days ago. This is also the time the pt's symptoms of myalgias, fatigue, cough, and fevers started, which have worsened over time and which is why he is here today. Pt has a pulse-oximeter at home. He notes his O2 sat to be typically 91-94%, and today was 85%. Pt took tylenol 2 houra menjivar.      Problem/Plan - 1:  ·  Problem: Acute respiratory failure with hypoxia.  Plan: Now on HF NC oxygen and titrating down to NC Oxygen . Pulmonary following.      Problem/Plan - 2:  ·  Problem:  Sepsis.  Plan: Present on admission. Resolved.     Likely sec to Viral infection. IVF ,Antiviral .      Problem/Plan - 3:  ·  Problem: Pneumonia.  Plan: Sec to COVID .      Problem/Plan - 4:  ·  Problem: COVID-19.  Plan: Started on Dexamethasone and Remidisvir . ID help appreciated.   Monitoring inflammatory markers.   CRP trending down.      Problem/Plan - 5:  ·  Problem: Lactic acid acidosis.  Plan: IVF. Trending down.      Problem/Plan - 6:  Problem: CAD (coronary artery disease). Plan: S/P stent in oct 2019 . DAPT .     Problem/Plan - 7:  ·  Problem: Diabetes with Hyperglycemia .  Plan: Sugars high . On Steroid. SSI and increased  Lantus . Will increase  Pre meal .      Problem/Plan - 8:  ·  Problem: HLD (hyperlipidemia).  Plan: Holding as on Remidisvir and watching LFT .      Problem/Plan - 9:  ·  Problem: Diarrhea.  Plan: Resolved.

## 2020-12-12 LAB
ALBUMIN SERPL ELPH-MCNC: 3.4 G/DL — SIGNIFICANT CHANGE UP (ref 3.3–5)
ALP SERPL-CCNC: 48 U/L — SIGNIFICANT CHANGE UP (ref 40–120)
ALT FLD-CCNC: 39 U/L — SIGNIFICANT CHANGE UP (ref 10–45)
ANION GAP SERPL CALC-SCNC: 16 MMOL/L — SIGNIFICANT CHANGE UP (ref 5–17)
AST SERPL-CCNC: 16 U/L — SIGNIFICANT CHANGE UP (ref 10–40)
BASOPHILS # BLD AUTO: 0.03 K/UL — SIGNIFICANT CHANGE UP (ref 0–0.2)
BASOPHILS NFR BLD AUTO: 0.3 % — SIGNIFICANT CHANGE UP (ref 0–2)
BILIRUB SERPL-MCNC: 0.4 MG/DL — SIGNIFICANT CHANGE UP (ref 0.2–1.2)
BUN SERPL-MCNC: 20 MG/DL — SIGNIFICANT CHANGE UP (ref 7–23)
CALCIUM SERPL-MCNC: 9.6 MG/DL — SIGNIFICANT CHANGE UP (ref 8.4–10.5)
CHLORIDE SERPL-SCNC: 99 MMOL/L — SIGNIFICANT CHANGE UP (ref 96–108)
CO2 SERPL-SCNC: 20 MMOL/L — LOW (ref 22–31)
CREAT SERPL-MCNC: 0.8 MG/DL — SIGNIFICANT CHANGE UP (ref 0.5–1.3)
CRP SERPL-MCNC: 1.3 MG/DL — HIGH (ref 0–0.4)
D DIMER BLD IA.RAPID-MCNC: 175 NG/ML DDU — SIGNIFICANT CHANGE UP
EOSINOPHIL # BLD AUTO: 0.07 K/UL — SIGNIFICANT CHANGE UP (ref 0–0.5)
EOSINOPHIL NFR BLD AUTO: 0.7 % — SIGNIFICANT CHANGE UP (ref 0–6)
FERRITIN SERPL-MCNC: 883 NG/ML — HIGH (ref 30–400)
GLUCOSE BLDC GLUCOMTR-MCNC: 158 MG/DL — HIGH (ref 70–99)
GLUCOSE BLDC GLUCOMTR-MCNC: 239 MG/DL — HIGH (ref 70–99)
GLUCOSE BLDC GLUCOMTR-MCNC: 243 MG/DL — HIGH (ref 70–99)
GLUCOSE BLDC GLUCOMTR-MCNC: 275 MG/DL — HIGH (ref 70–99)
GLUCOSE SERPL-MCNC: 264 MG/DL — HIGH (ref 70–99)
HCT VFR BLD CALC: 43.4 % — SIGNIFICANT CHANGE UP (ref 39–50)
HGB BLD-MCNC: 14.4 G/DL — SIGNIFICANT CHANGE UP (ref 13–17)
IMM GRANULOCYTES NFR BLD AUTO: 3.4 % — HIGH (ref 0–1.5)
LYMPHOCYTES # BLD AUTO: 1.51 K/UL — SIGNIFICANT CHANGE UP (ref 1–3.3)
LYMPHOCYTES # BLD AUTO: 15.6 % — SIGNIFICANT CHANGE UP (ref 13–44)
MCHC RBC-ENTMCNC: 29.8 PG — SIGNIFICANT CHANGE UP (ref 27–34)
MCHC RBC-ENTMCNC: 33.2 GM/DL — SIGNIFICANT CHANGE UP (ref 32–36)
MCV RBC AUTO: 89.7 FL — SIGNIFICANT CHANGE UP (ref 80–100)
MONOCYTES # BLD AUTO: 0.41 K/UL — SIGNIFICANT CHANGE UP (ref 0–0.9)
MONOCYTES NFR BLD AUTO: 4.2 % — SIGNIFICANT CHANGE UP (ref 2–14)
NEUTROPHILS # BLD AUTO: 7.34 K/UL — SIGNIFICANT CHANGE UP (ref 1.8–7.4)
NEUTROPHILS NFR BLD AUTO: 75.8 % — SIGNIFICANT CHANGE UP (ref 43–77)
NRBC # BLD: 0 /100 WBCS — SIGNIFICANT CHANGE UP (ref 0–0)
PLATELET # BLD AUTO: 403 K/UL — HIGH (ref 150–400)
POTASSIUM SERPL-MCNC: 4.3 MMOL/L — SIGNIFICANT CHANGE UP (ref 3.5–5.3)
POTASSIUM SERPL-SCNC: 4.3 MMOL/L — SIGNIFICANT CHANGE UP (ref 3.5–5.3)
PROT SERPL-MCNC: 6.7 G/DL — SIGNIFICANT CHANGE UP (ref 6–8.3)
RBC # BLD: 4.84 M/UL — SIGNIFICANT CHANGE UP (ref 4.2–5.8)
RBC # FLD: 11.8 % — SIGNIFICANT CHANGE UP (ref 10.3–14.5)
SODIUM SERPL-SCNC: 135 MMOL/L — SIGNIFICANT CHANGE UP (ref 135–145)
WBC # BLD: 9.69 K/UL — SIGNIFICANT CHANGE UP (ref 3.8–10.5)
WBC # FLD AUTO: 9.69 K/UL — SIGNIFICANT CHANGE UP (ref 3.8–10.5)

## 2020-12-12 RX ADMIN — Medication 6 MILLIGRAM(S): at 06:29

## 2020-12-12 RX ADMIN — Medication 100 MILLIGRAM(S): at 16:40

## 2020-12-12 RX ADMIN — ALBUTEROL 2 PUFF(S): 90 AEROSOL, METERED ORAL at 00:37

## 2020-12-12 RX ADMIN — ALBUTEROL 2 PUFF(S): 90 AEROSOL, METERED ORAL at 14:29

## 2020-12-12 RX ADMIN — INSULIN GLARGINE 35 UNIT(S): 100 INJECTION, SOLUTION SUBCUTANEOUS at 21:47

## 2020-12-12 RX ADMIN — CLOPIDOGREL BISULFATE 75 MILLIGRAM(S): 75 TABLET, FILM COATED ORAL at 14:29

## 2020-12-12 RX ADMIN — Medication 8: at 17:18

## 2020-12-12 RX ADMIN — ALBUTEROL 2 PUFF(S): 90 AEROSOL, METERED ORAL at 17:21

## 2020-12-12 RX ADMIN — Medication 4: at 08:21

## 2020-12-12 RX ADMIN — Medication 81 MILLIGRAM(S): at 14:29

## 2020-12-12 RX ADMIN — Medication 7 UNIT(S): at 17:19

## 2020-12-12 RX ADMIN — Medication 7 UNIT(S): at 08:22

## 2020-12-12 RX ADMIN — ENOXAPARIN SODIUM 40 MILLIGRAM(S): 100 INJECTION SUBCUTANEOUS at 17:19

## 2020-12-12 RX ADMIN — Medication 100 MILLIGRAM(S): at 21:47

## 2020-12-12 RX ADMIN — Medication 12: at 12:05

## 2020-12-12 RX ADMIN — Medication 1 TABLET(S): at 14:29

## 2020-12-12 RX ADMIN — Medication 7 UNIT(S): at 12:09

## 2020-12-12 RX ADMIN — ALBUTEROL 2 PUFF(S): 90 AEROSOL, METERED ORAL at 06:29

## 2020-12-12 RX ADMIN — ENOXAPARIN SODIUM 40 MILLIGRAM(S): 100 INJECTION SUBCUTANEOUS at 06:29

## 2020-12-12 RX ADMIN — Medication 100 MILLIGRAM(S): at 06:29

## 2020-12-12 NOTE — PROGRESS NOTE ADULT - PROBLEM SELECTOR PLAN 1
+COVID PCR  -S/p Remdesivir x 5 days (completed 12/9)  -C/w Decadron x 10 days  -C/w tessalon perle 100mg q8h  -Trend inflammatory markers  -DVT ppx  -CXR in AM

## 2020-12-12 NOTE — PROGRESS NOTE ADULT - SUBJECTIVE AND OBJECTIVE BOX
INTERVAL HPI/OVERNIGHT EVENTS: I feel better and want to go home tomorrow.   Vital Signs Last 24 Hrs  T(C): 36.6 (12 Dec 2020 04:54), Max: 36.7 (11 Dec 2020 22:08)  T(F): 97.9 (12 Dec 2020 04:54), Max: 98.1 (11 Dec 2020 22:08)  HR: 74 (12 Dec 2020 10:02) (62 - 85)  BP: 124/78 (12 Dec 2020 04:54) (124/78 - 145/88)  BP(mean): --  RR: 18 (12 Dec 2020 10:02) (17 - 20)  SpO2: 95% (12 Dec 2020 10:02) (94% - 97%)  I&O's Summary    11 Dec 2020 07:01  -  12 Dec 2020 07:00  --------------------------------------------------------  IN: 1080 mL / OUT: 950 mL / NET: 130 mL    12 Dec 2020 07:01  -  12 Dec 2020 13:57  --------------------------------------------------------  IN: 120 mL / OUT: 300 mL / NET: -180 mL      MEDICATIONS  (STANDING):  ALBUTerol    90 MICROgram(s) HFA Inhaler 2 Puff(s) Inhalation every 6 hours  aspirin enteric coated 81 milliGRAM(s) Oral daily  benzonatate 100 milliGRAM(s) Oral three times a day  clopidogrel Tablet 75 milliGRAM(s) Oral daily  dexAMETHasone  Injectable 6 milliGRAM(s) IV Push daily  dextrose 40% Gel 15 Gram(s) Oral once  dextrose 5%. 1000 milliLiter(s) (50 mL/Hr) IV Continuous <Continuous>  dextrose 5%. 1000 milliLiter(s) (100 mL/Hr) IV Continuous <Continuous>  dextrose 50% Injectable 25 Gram(s) IV Push once  dextrose 50% Injectable 12.5 Gram(s) IV Push once  dextrose 50% Injectable 25 Gram(s) IV Push once  enoxaparin Injectable 40 milliGRAM(s) SubCutaneous every 12 hours  glucagon  Injectable 1 milliGRAM(s) IntraMuscular once  influenza   Vaccine 0.5 milliLiter(s) IntraMuscular once  insulin glargine Injectable (LANTUS) 35 Unit(s) SubCutaneous at bedtime  insulin lispro (ADMELOG) corrective regimen sliding scale   SubCutaneous three times a day before meals  insulin lispro (ADMELOG) corrective regimen sliding scale   SubCutaneous at bedtime  insulin lispro Injectable (ADMELOG) 7 Unit(s) SubCutaneous three times a day before meals  multivitamin 1 Tablet(s) Oral daily    MEDICATIONS  (PRN):  acetaminophen   Tablet .. 650 milliGRAM(s) Oral every 4 hours PRN Temp greater or equal to 38.5C (101.3F)    LABS:                        14.4   9.69  )-----------( 403      ( 12 Dec 2020 10:52 )             43.4     12-12    135  |  99  |  20  ----------------------------<  264<H>  4.3   |  20<L>  |  0.80    Ca    9.6      12 Dec 2020 10:52    TPro  6.7  /  Alb  3.4  /  TBili  0.4  /  DBili  x   /  AST  16  /  ALT  39  /  AlkPhos  48  12-12        CAPILLARY BLOOD GLUCOSE      POCT Blood Glucose.: 275 mg/dL (12 Dec 2020 12:05)  POCT Blood Glucose.: 158 mg/dL (12 Dec 2020 07:45)  POCT Blood Glucose.: 255 mg/dL (11 Dec 2020 22:05)  POCT Blood Glucose.: 263 mg/dL (11 Dec 2020 16:36)          REVIEW OF SYSTEMS:  CONSTITUTIONAL: No fever, weight loss, or fatigue  EYES: No eye pain, visual disturbances, or discharge  ENMT:  No difficulty hearing, tinnitus, vertigo; No sinus or throat pain  NECK: No pain or stiffness  RESPIRATORY: No cough, wheezing, chills or hemoptysis; No shortness of breath  CARDIOVASCULAR: No chest pain, palpitations, dizziness, or leg swelling  GASTROINTESTINAL: No abdominal or epigastric pain. No nausea, vomiting, or hematemesis; No diarrhea or constipation. No melena or hematochezia.  GENITOURINARY: No dysuria, frequency, hematuria, or incontinence  NEUROLOGICAL: No headaches, memory loss, loss of strength, numbness, or tremors  SKIN: No itching, burning, rashes, or lesions   ENDOCRINE: No heat or cold intolerance; No hair loss  MUSCULOSKELETAL: No joint pain or swelling; No muscle, back, or extremity pain  PSYCHIATRIC: No depression, anxiety, mood swings, or difficulty sleeping  HEME/LYMPH: No easy bruising, or bleeding gums  ALLERY AND IMMUNOLOGIC: No hives or eczema    RADIOLOGY & ADDITIONAL TESTS:    Consultant(s) Notes Reviewed:  [x ] YES  [ ] NO    PHYSICAL EXAM:  GENERAL: NAD, well-groomed, NC Oxygen   HEAD:  Atraumatic, Normocephalic  EYES: EOMI, PERRLA, conjunctiva and sclera clear  ENMT: No tonsillar erythema, exudates, or enlargement; Moist mucous membranes, Good dentition, No lesions  NECK: Supple, No JVD, Normal thyroid  NERVOUS SYSTEM:  Alert & Oriented X3, No focal deficit   CHEST/LUNG: Good air entry bilateral with no  rales, rhonchi, wheezing, or rubs  HEART: Regular rate and rhythm; No murmurs, rubs, or gallops  ABDOMEN: Soft, Nontender, Nondistended; Bowel sounds present  EXTREMITIES:  2+ Peripheral Pulses, No clubbing, cyanosis, or edema  SKIN: No rashes or lesions    Care Discussed with Consultants/Other Providers [ x] YES  [ ] NO

## 2020-12-12 NOTE — PROGRESS NOTE ADULT - ASSESSMENT
49 yo M hx CAD s/p 1x stent, COVID+, c/o worsening SOB and hypoxia. Pt states he tested positive for COVID the day before Thanksgiving. This is also the time the pt's symptoms of myalgias, fatigue, cough, and fevers started, which have worsened over time. Initially requiring high flow NC - now with improvement in hypoxia, on nasal cannula.

## 2020-12-12 NOTE — PROGRESS NOTE ADULT - ASSESSMENT
51 yo M hx CAD s/p 1x stent, COVID+, c/o worsening SOB. Pt states he tested positive for COVID the day before Thanksgiving 10 days ago. This is also the time the pt's symptoms of myalgias, fatigue, cough, and fevers started, which have worsened over time and which is why he is here today. Pt has a pulse-oximeter at home. He notes his O2 sat to be typically 91-94%, and today was 85%. Pt took tylenol 2 houra menjivar.      Problem/Plan - 1:  ·  Problem: Acute respiratory failure with hypoxia.  Plan: Now on HF NC oxygen and now on  NC Oxygen . Pulmonary following.      Problem/Plan - 2:  ·  Problem:  Sepsis.  Plan: Present on admission. Resolved.     Likely sec to Viral infection. IVF ,Antiviral .      Problem/Plan - 3:  ·  Problem: Pneumonia.  Plan: Sec to COVID .      Problem/Plan - 4:  ·  Problem: COVID-19.  Plan: Started on Dexamethasone and Remidisvir . ID help appreciated.   Monitoring inflammatory markers and are trending down.      Problem/Plan - 5:  ·  Problem: Lactic acid acidosis.  Plan: IVF. Trending down.      Problem/Plan - 6:  Problem: CAD (coronary artery disease). Plan: S/P stent in oct 2019 . DAPT .     Problem/Plan - 7:  ·  Problem: Diabetes with Hyperglycemia .  Plan:On Steroid. SSI and increased  Lantus . Will increase  Pre meal . Sugars better now.      Problem/Plan - 8:  ·  Problem: HLD (hyperlipidemia).  Plan: Holding as on Remidisvir and watching LFT .      Problem/Plan - 9:  ·  Problem: Diarrhea.  Plan: Resolved.     Disposition ; DC planning home in next 48hrs.

## 2020-12-12 NOTE — PROGRESS NOTE ADULT - SUBJECTIVE AND OBJECTIVE BOX
Follow-up Pulm Progress Note    Feeling good, wants to go home  Sats currently 94% on 4LNC  O2 lowered to 3LNC - sats maintaining 92-93%   Mild ABEBE    Medications:  MEDICATIONS  (STANDING):  ALBUTerol    90 MICROgram(s) HFA Inhaler 2 Puff(s) Inhalation every 6 hours  aspirin enteric coated 81 milliGRAM(s) Oral daily  benzonatate 100 milliGRAM(s) Oral three times a day  clopidogrel Tablet 75 milliGRAM(s) Oral daily  dexAMETHasone  Injectable 6 milliGRAM(s) IV Push daily  dextrose 40% Gel 15 Gram(s) Oral once  dextrose 5%. 1000 milliLiter(s) (50 mL/Hr) IV Continuous <Continuous>  dextrose 5%. 1000 milliLiter(s) (100 mL/Hr) IV Continuous <Continuous>  dextrose 50% Injectable 25 Gram(s) IV Push once  dextrose 50% Injectable 12.5 Gram(s) IV Push once  dextrose 50% Injectable 25 Gram(s) IV Push once  enoxaparin Injectable 40 milliGRAM(s) SubCutaneous every 12 hours  glucagon  Injectable 1 milliGRAM(s) IntraMuscular once  influenza   Vaccine 0.5 milliLiter(s) IntraMuscular once  insulin glargine Injectable (LANTUS) 35 Unit(s) SubCutaneous at bedtime  insulin lispro (ADMELOG) corrective regimen sliding scale   SubCutaneous three times a day before meals  insulin lispro (ADMELOG) corrective regimen sliding scale   SubCutaneous at bedtime  insulin lispro Injectable (ADMELOG) 7 Unit(s) SubCutaneous three times a day before meals  multivitamin 1 Tablet(s) Oral daily    MEDICATIONS  (PRN):  acetaminophen   Tablet .. 650 milliGRAM(s) Oral every 4 hours PRN Temp greater or equal to 38.5C (101.3F)          Vital Signs Last 24 Hrs  T(C): 36.6 (12 Dec 2020 04:54), Max: 36.8 (11 Dec 2020 12:44)  T(F): 97.9 (12 Dec 2020 04:54), Max: 98.3 (11 Dec 2020 12:44)  HR: 64 (12 Dec 2020 05:35) (62 - 85)  BP: 124/78 (12 Dec 2020 04:54) (115/71 - 145/88)  BP(mean): --  RR: 18 (12 Dec 2020 05:35) (17 - 20)  SpO2: 95% (12 Dec 2020 05:35) (94% - 97%)          12-11 @ 07:01  -  12-12 @ 07:00  --------------------------------------------------------  IN: 1080 mL / OUT: 950 mL / NET: 130 mL          LABS:                        14.5   10.06 )-----------( 447      ( 11 Dec 2020 09:57 )             42.3     12-11    135  |  98  |  20  ----------------------------<  280<H>  4.5   |  22  |  0.71    Ca    9.5      11 Dec 2020 09:57    TPro  6.9  /  Alb  3.4  /  TBili  0.4  /  DBili  <0.1  /  AST  21  /  ALT  41  /  AlkPhos  51  12-11          CAPILLARY BLOOD GLUCOSE      POCT Blood Glucose.: 158 mg/dL (12 Dec 2020 07:45)          Physical Examination:  PULM: decreased BS  CVS: RRR    RADIOLOGY REVIEWED  CXR 12/5: bilateral patchy opacities

## 2020-12-12 NOTE — PROGRESS NOTE ADULT - PROBLEM SELECTOR PLAN 2
2nd to COVID PNA  -CXR 12/5 bilateral patchy opacities   -Hypoxia continues to improve  -Ddimer <500  -Transitioned to nasal cannula yesterday evening. D/c high flow.  -Decrease O2 to 3LNC. Continue to wean supplemental O2 as tolerated, keep sats >90%  -If sats remain >90% on 2-3LNC at rest and with ambulation over next 24 hrs no objections to d/c planning home with O2. Unsure if O2 can be delivered on Sunday, likely d/c planning Monday.  -Will need to f/u in office 2 weeks after discharge, d/w pt in detail. 2nd to COVID PNA  -CXR 12/5 bilateral patchy opacities   -Hypoxia continues to improve  -Ddimer <500  -Transitioned to nasal cannula yesterday evening. D/c high flow.  -Decrease O2 to 3LNC. Continue to wean supplemental O2 as tolerated, keep sats >90%  -If sats remain >90% on 2-3LNC at rest and with ambulation over next 24 hrs no objections to d/c planning home with O2 (if still needed). Unsure if O2 can be delivered on Sunday, likely d/c planning Monday.  -Will need to f/u in office 2 weeks after discharge, d/w pt in detail.

## 2020-12-13 LAB
ALBUMIN SERPL ELPH-MCNC: 3.4 G/DL — SIGNIFICANT CHANGE UP (ref 3.3–5)
ALP SERPL-CCNC: 45 U/L — SIGNIFICANT CHANGE UP (ref 40–120)
ALT FLD-CCNC: 35 U/L — SIGNIFICANT CHANGE UP (ref 10–45)
ANION GAP SERPL CALC-SCNC: 14 MMOL/L — SIGNIFICANT CHANGE UP (ref 5–17)
AST SERPL-CCNC: 12 U/L — SIGNIFICANT CHANGE UP (ref 10–40)
BASOPHILS # BLD AUTO: 0.03 K/UL — SIGNIFICANT CHANGE UP (ref 0–0.2)
BASOPHILS NFR BLD AUTO: 0.3 % — SIGNIFICANT CHANGE UP (ref 0–2)
BILIRUB SERPL-MCNC: 0.3 MG/DL — SIGNIFICANT CHANGE UP (ref 0.2–1.2)
BUN SERPL-MCNC: 19 MG/DL — SIGNIFICANT CHANGE UP (ref 7–23)
CALCIUM SERPL-MCNC: 9.3 MG/DL — SIGNIFICANT CHANGE UP (ref 8.4–10.5)
CHLORIDE SERPL-SCNC: 102 MMOL/L — SIGNIFICANT CHANGE UP (ref 96–108)
CO2 SERPL-SCNC: 22 MMOL/L — SIGNIFICANT CHANGE UP (ref 22–31)
CREAT SERPL-MCNC: 0.7 MG/DL — SIGNIFICANT CHANGE UP (ref 0.5–1.3)
EOSINOPHIL # BLD AUTO: 0.06 K/UL — SIGNIFICANT CHANGE UP (ref 0–0.5)
EOSINOPHIL NFR BLD AUTO: 0.6 % — SIGNIFICANT CHANGE UP (ref 0–6)
GLUCOSE BLDC GLUCOMTR-MCNC: 191 MG/DL — HIGH (ref 70–99)
GLUCOSE BLDC GLUCOMTR-MCNC: 200 MG/DL — HIGH (ref 70–99)
GLUCOSE BLDC GLUCOMTR-MCNC: 203 MG/DL — HIGH (ref 70–99)
GLUCOSE BLDC GLUCOMTR-MCNC: 207 MG/DL — HIGH (ref 70–99)
GLUCOSE SERPL-MCNC: 165 MG/DL — HIGH (ref 70–99)
HCT VFR BLD CALC: 43.5 % — SIGNIFICANT CHANGE UP (ref 39–50)
HGB BLD-MCNC: 14.4 G/DL — SIGNIFICANT CHANGE UP (ref 13–17)
IMM GRANULOCYTES NFR BLD AUTO: 1.8 % — HIGH (ref 0–1.5)
LYMPHOCYTES # BLD AUTO: 2.98 K/UL — SIGNIFICANT CHANGE UP (ref 1–3.3)
LYMPHOCYTES # BLD AUTO: 29.3 % — SIGNIFICANT CHANGE UP (ref 13–44)
MCHC RBC-ENTMCNC: 29.3 PG — SIGNIFICANT CHANGE UP (ref 27–34)
MCHC RBC-ENTMCNC: 33.1 GM/DL — SIGNIFICANT CHANGE UP (ref 32–36)
MCV RBC AUTO: 88.4 FL — SIGNIFICANT CHANGE UP (ref 80–100)
MONOCYTES # BLD AUTO: 0.83 K/UL — SIGNIFICANT CHANGE UP (ref 0–0.9)
MONOCYTES NFR BLD AUTO: 8.2 % — SIGNIFICANT CHANGE UP (ref 2–14)
NEUTROPHILS # BLD AUTO: 6.1 K/UL — SIGNIFICANT CHANGE UP (ref 1.8–7.4)
NEUTROPHILS NFR BLD AUTO: 59.8 % — SIGNIFICANT CHANGE UP (ref 43–77)
NRBC # BLD: 0 /100 WBCS — SIGNIFICANT CHANGE UP (ref 0–0)
PLATELET # BLD AUTO: 398 K/UL — SIGNIFICANT CHANGE UP (ref 150–400)
POTASSIUM SERPL-MCNC: 3.9 MMOL/L — SIGNIFICANT CHANGE UP (ref 3.5–5.3)
POTASSIUM SERPL-SCNC: 3.9 MMOL/L — SIGNIFICANT CHANGE UP (ref 3.5–5.3)
PROT SERPL-MCNC: 6.6 G/DL — SIGNIFICANT CHANGE UP (ref 6–8.3)
RBC # BLD: 4.92 M/UL — SIGNIFICANT CHANGE UP (ref 4.2–5.8)
RBC # FLD: 11.9 % — SIGNIFICANT CHANGE UP (ref 10.3–14.5)
SODIUM SERPL-SCNC: 138 MMOL/L — SIGNIFICANT CHANGE UP (ref 135–145)
WBC # BLD: 10.18 K/UL — SIGNIFICANT CHANGE UP (ref 3.8–10.5)
WBC # FLD AUTO: 10.18 K/UL — SIGNIFICANT CHANGE UP (ref 3.8–10.5)

## 2020-12-13 PROCEDURE — 71045 X-RAY EXAM CHEST 1 VIEW: CPT | Mod: 26

## 2020-12-13 RX ADMIN — Medication 100 MILLIGRAM(S): at 13:16

## 2020-12-13 RX ADMIN — ALBUTEROL 2 PUFF(S): 90 AEROSOL, METERED ORAL at 19:21

## 2020-12-13 RX ADMIN — ENOXAPARIN SODIUM 40 MILLIGRAM(S): 100 INJECTION SUBCUTANEOUS at 19:21

## 2020-12-13 RX ADMIN — ALBUTEROL 2 PUFF(S): 90 AEROSOL, METERED ORAL at 12:17

## 2020-12-13 RX ADMIN — Medication 100 MILLIGRAM(S): at 06:28

## 2020-12-13 RX ADMIN — Medication 4: at 12:16

## 2020-12-13 RX ADMIN — ALBUTEROL 2 PUFF(S): 90 AEROSOL, METERED ORAL at 00:40

## 2020-12-13 RX ADMIN — Medication 1 TABLET(S): at 12:17

## 2020-12-13 RX ADMIN — ENOXAPARIN SODIUM 40 MILLIGRAM(S): 100 INJECTION SUBCUTANEOUS at 06:29

## 2020-12-13 RX ADMIN — Medication 8: at 16:59

## 2020-12-13 RX ADMIN — Medication 7 UNIT(S): at 12:16

## 2020-12-13 RX ADMIN — Medication 6 MILLIGRAM(S): at 06:28

## 2020-12-13 RX ADMIN — INSULIN GLARGINE 35 UNIT(S): 100 INJECTION, SOLUTION SUBCUTANEOUS at 22:35

## 2020-12-13 RX ADMIN — Medication 7 UNIT(S): at 08:34

## 2020-12-13 RX ADMIN — Medication 7 UNIT(S): at 17:00

## 2020-12-13 RX ADMIN — CLOPIDOGREL BISULFATE 75 MILLIGRAM(S): 75 TABLET, FILM COATED ORAL at 12:17

## 2020-12-13 RX ADMIN — Medication 8: at 08:33

## 2020-12-13 RX ADMIN — ALBUTEROL 2 PUFF(S): 90 AEROSOL, METERED ORAL at 06:29

## 2020-12-13 RX ADMIN — Medication 100 MILLIGRAM(S): at 22:35

## 2020-12-13 RX ADMIN — Medication 81 MILLIGRAM(S): at 12:17

## 2020-12-13 NOTE — PROGRESS NOTE ADULT - ASSESSMENT
51 yo M hx CAD s/p 1x stent, COVID+, c/o worsening SOB. Pt states he tested positive for COVID the day before Thanksgiving 10 days ago. This is also the time the pt's symptoms of myalgias, fatigue, cough, and fevers started, which have worsened over time and which is why he is here today. Pt has a pulse-oximeter at home. He notes his O2 sat to be typically 91-94%, and today was 85%. Pt took tylenol 2 houra menjivar.      Problem/Plan - 1:  ·  Problem: Acute respiratory failure with hypoxia.  Plan: Off HF NC oxygen and now on  NC Oxygen. Will need Home oxygen.  Pulmonary following.      Problem/Plan - 2:  ·  Problem:  Sepsis.  Plan: Present on admission. Resolved.     Likely sec to Viral infection. IVF ,Antiviral .      Problem/Plan - 3:  ·  Problem: Pneumonia.  Plan: Sec to COVID .      Problem/Plan - 4:  ·  Problem: COVID-19.  Plan: Started on Dexamethasone and Remidisvir . ID help appreciated.   Monitoring inflammatory markers and are trending down.      Problem/Plan - 5:  ·  Problem: Lactic acid acidosis.  Plan: IVF. Trending down.      Problem/Plan - 6:  Problem: CAD (coronary artery disease). Plan: S/P stent in oct 2019 . DAPT .     Problem/Plan - 7:  ·  Problem: Diabetes with Hyperglycemia .  Plan:On Steroid. SSI and increased  Lantus . Will increase  Pre meal . Sugars better now.      Problem/Plan - 8:  ·  Problem: HLD (hyperlipidemia).  Plan: Holding as on Remidisvir and watching LFT .      Problem/Plan - 9:  ·  Problem: Diarrhea.  Plan: Resolved.     Disposition ; DC planning home with Home Oxygen .

## 2020-12-13 NOTE — PROGRESS NOTE ADULT - SUBJECTIVE AND OBJECTIVE BOX
INTERVAL HPI/OVERNIGHT EVENTS: I feel fine. I want to go home tomorrow.   Vital Signs Last 24 Hrs  T(C): 36.9 (13 Dec 2020 21:00), Max: 37.1 (13 Dec 2020 12:30)  T(F): 98.4 (13 Dec 2020 21:00), Max: 98.7 (13 Dec 2020 12:30)  HR: 75 (13 Dec 2020 21:00) (61 - 96)  BP: 117/71 (13 Dec 2020 21:00) (117/71 - 127/84)  BP(mean): --  RR: 18 (13 Dec 2020 21:00) (18 - 19)  SpO2: 97% (13 Dec 2020 21:00) (84% - 98%)  I&O's Summary    12 Dec 2020 07:01  -  13 Dec 2020 07:00  --------------------------------------------------------  IN: 485 mL / OUT: 300 mL / NET: 185 mL      MEDICATIONS  (STANDING):  ALBUTerol    90 MICROgram(s) HFA Inhaler 2 Puff(s) Inhalation every 6 hours  aspirin enteric coated 81 milliGRAM(s) Oral daily  benzonatate 100 milliGRAM(s) Oral three times a day  clopidogrel Tablet 75 milliGRAM(s) Oral daily  dexAMETHasone  Injectable 6 milliGRAM(s) IV Push daily  dextrose 40% Gel 15 Gram(s) Oral once  dextrose 5%. 1000 milliLiter(s) (50 mL/Hr) IV Continuous <Continuous>  dextrose 5%. 1000 milliLiter(s) (100 mL/Hr) IV Continuous <Continuous>  dextrose 50% Injectable 25 Gram(s) IV Push once  dextrose 50% Injectable 12.5 Gram(s) IV Push once  dextrose 50% Injectable 25 Gram(s) IV Push once  enoxaparin Injectable 40 milliGRAM(s) SubCutaneous every 12 hours  glucagon  Injectable 1 milliGRAM(s) IntraMuscular once  influenza   Vaccine 0.5 milliLiter(s) IntraMuscular once  insulin glargine Injectable (LANTUS) 35 Unit(s) SubCutaneous at bedtime  insulin lispro (ADMELOG) corrective regimen sliding scale   SubCutaneous three times a day before meals  insulin lispro (ADMELOG) corrective regimen sliding scale   SubCutaneous at bedtime  insulin lispro Injectable (ADMELOG) 7 Unit(s) SubCutaneous three times a day before meals  multivitamin 1 Tablet(s) Oral daily    MEDICATIONS  (PRN):  acetaminophen   Tablet .. 650 milliGRAM(s) Oral every 4 hours PRN Temp greater or equal to 38.5C (101.3F)    LABS:                        14.4   10.18 )-----------( 398      ( 13 Dec 2020 06:10 )             43.5     12-13    138  |  102  |  19  ----------------------------<  165<H>  3.9   |  22  |  0.70    Ca    9.3      13 Dec 2020 06:09    TPro  6.6  /  Alb  3.4  /  TBili  0.3  /  DBili  x   /  AST  12  /  ALT  35  /  AlkPhos  45  12-13        CAPILLARY BLOOD GLUCOSE      POCT Blood Glucose.: 191 mg/dL (13 Dec 2020 21:49)  POCT Blood Glucose.: 203 mg/dL (13 Dec 2020 16:44)  POCT Blood Glucose.: 200 mg/dL (13 Dec 2020 12:04)  POCT Blood Glucose.: 207 mg/dL (13 Dec 2020 08:30)          REVIEW OF SYSTEMS:  CONSTITUTIONAL: No fever, weight loss, or fatigue  EYES: No eye pain, visual disturbances, or discharge  ENMT:  No difficulty hearing, tinnitus, vertigo; No sinus or throat pain  NECK: No pain or stiffness  RESPIRATORY: No cough, wheezing, chills or hemoptysis; No shortness of breath  CARDIOVASCULAR: No chest pain, palpitations, dizziness, or leg swelling  GASTROINTESTINAL: No abdominal or epigastric pain. No nausea, vomiting, or hematemesis; No diarrhea or constipation. No melena or hematochezia.  GENITOURINARY: No dysuria, frequency, hematuria, or incontinence  NEUROLOGICAL: No headaches, memory loss, loss of strength, numbness, or tremors      Consultant(s) Notes Reviewed:  [x ] YES  [ ] NO    PHYSICAL EXAM:  GENERAL: NAD, Oxygen NC   HEAD:  Atraumatic, Normocephalic  EYES: EOMI, PERRLA, conjunctiva and sclera clear  ENMT: No tonsillar erythema, exudates, or enlargement; Moist mucous membranes, Good dentition, No lesions  NECK: Supple, No JVD, Normal thyroid  NERVOUS SYSTEM:  Alert & Oriented X3, No focal deficit   CHEST/LUNG: Good air entry bilateral with no  rales, rhonchi, wheezing, or rubs  HEART: Regular rate and rhythm; No murmurs, rubs, or gallops  ABDOMEN: Soft, Nontender, Nondistended; Bowel sounds present  EXTREMITIES:  2+ Peripheral Pulses, No clubbing, cyanosis, or edema  SKIN: No rashes or lesions    Care Discussed with Consultants/Other Providers [ x] YES  [ ] NO

## 2020-12-14 ENCOUNTER — TRANSCRIPTION ENCOUNTER (OUTPATIENT)
Age: 50
End: 2020-12-14

## 2020-12-14 VITALS — OXYGEN SATURATION: 94 % | RESPIRATION RATE: 18 BRPM

## 2020-12-14 DIAGNOSIS — E11.9 TYPE 2 DIABETES MELLITUS WITHOUT COMPLICATIONS: ICD-10-CM

## 2020-12-14 LAB
ALBUMIN SERPL ELPH-MCNC: 3.6 G/DL — SIGNIFICANT CHANGE UP (ref 3.3–5)
ALBUMIN SERPL ELPH-MCNC: 3.7 G/DL — SIGNIFICANT CHANGE UP (ref 3.3–5)
ALP SERPL-CCNC: 45 U/L — SIGNIFICANT CHANGE UP (ref 40–120)
ALP SERPL-CCNC: 45 U/L — SIGNIFICANT CHANGE UP (ref 40–120)
ALT FLD-CCNC: 33 U/L — SIGNIFICANT CHANGE UP (ref 10–45)
ALT FLD-CCNC: 34 U/L — SIGNIFICANT CHANGE UP (ref 10–45)
ANION GAP SERPL CALC-SCNC: 14 MMOL/L — SIGNIFICANT CHANGE UP (ref 5–17)
AST SERPL-CCNC: 13 U/L — SIGNIFICANT CHANGE UP (ref 10–40)
AST SERPL-CCNC: 18 U/L — SIGNIFICANT CHANGE UP (ref 10–40)
BILIRUB DIRECT SERPL-MCNC: <0.1 MG/DL — SIGNIFICANT CHANGE UP (ref 0–0.2)
BILIRUB INDIRECT FLD-MCNC: >0.3 MG/DL — SIGNIFICANT CHANGE UP (ref 0.2–1)
BILIRUB SERPL-MCNC: 0.4 MG/DL — SIGNIFICANT CHANGE UP (ref 0.2–1.2)
BILIRUB SERPL-MCNC: 0.4 MG/DL — SIGNIFICANT CHANGE UP (ref 0.2–1.2)
BUN SERPL-MCNC: 19 MG/DL — SIGNIFICANT CHANGE UP (ref 7–23)
CALCIUM SERPL-MCNC: 9.3 MG/DL — SIGNIFICANT CHANGE UP (ref 8.4–10.5)
CHLORIDE SERPL-SCNC: 103 MMOL/L — SIGNIFICANT CHANGE UP (ref 96–108)
CO2 SERPL-SCNC: 22 MMOL/L — SIGNIFICANT CHANGE UP (ref 22–31)
CREAT SERPL-MCNC: 0.77 MG/DL — SIGNIFICANT CHANGE UP (ref 0.5–1.3)
CREAT SERPL-MCNC: 0.8 MG/DL — SIGNIFICANT CHANGE UP (ref 0.5–1.3)
CRP SERPL-MCNC: 0.53 MG/DL — HIGH (ref 0–0.4)
D DIMER BLD IA.RAPID-MCNC: 202 NG/ML DDU — SIGNIFICANT CHANGE UP
FERRITIN SERPL-MCNC: 806 NG/ML — HIGH (ref 30–400)
GLUCOSE BLDC GLUCOMTR-MCNC: 141 MG/DL — HIGH (ref 70–99)
GLUCOSE BLDC GLUCOMTR-MCNC: 187 MG/DL — HIGH (ref 70–99)
GLUCOSE SERPL-MCNC: 125 MG/DL — HIGH (ref 70–99)
HCT VFR BLD CALC: 42.8 % — SIGNIFICANT CHANGE UP (ref 39–50)
HGB BLD-MCNC: 14.2 G/DL — SIGNIFICANT CHANGE UP (ref 13–17)
MCHC RBC-ENTMCNC: 29.9 PG — SIGNIFICANT CHANGE UP (ref 27–34)
MCHC RBC-ENTMCNC: 33.2 GM/DL — SIGNIFICANT CHANGE UP (ref 32–36)
MCV RBC AUTO: 90.1 FL — SIGNIFICANT CHANGE UP (ref 80–100)
NRBC # BLD: 0 /100 WBCS — SIGNIFICANT CHANGE UP (ref 0–0)
PLATELET # BLD AUTO: 382 K/UL — SIGNIFICANT CHANGE UP (ref 150–400)
POTASSIUM SERPL-MCNC: 4 MMOL/L — SIGNIFICANT CHANGE UP (ref 3.5–5.3)
POTASSIUM SERPL-SCNC: 4 MMOL/L — SIGNIFICANT CHANGE UP (ref 3.5–5.3)
PROT SERPL-MCNC: 6.6 G/DL — SIGNIFICANT CHANGE UP (ref 6–8.3)
PROT SERPL-MCNC: 6.7 G/DL — SIGNIFICANT CHANGE UP (ref 6–8.3)
RBC # BLD: 4.75 M/UL — SIGNIFICANT CHANGE UP (ref 4.2–5.8)
RBC # FLD: 11.9 % — SIGNIFICANT CHANGE UP (ref 10.3–14.5)
SODIUM SERPL-SCNC: 139 MMOL/L — SIGNIFICANT CHANGE UP (ref 135–145)
WBC # BLD: 9.68 K/UL — SIGNIFICANT CHANGE UP (ref 3.8–10.5)
WBC # FLD AUTO: 9.68 K/UL — SIGNIFICANT CHANGE UP (ref 3.8–10.5)

## 2020-12-14 PROCEDURE — 84295 ASSAY OF SERUM SODIUM: CPT

## 2020-12-14 PROCEDURE — 99232 SBSQ HOSP IP/OBS MODERATE 35: CPT

## 2020-12-14 PROCEDURE — U0003: CPT

## 2020-12-14 PROCEDURE — 85027 COMPLETE CBC AUTOMATED: CPT

## 2020-12-14 PROCEDURE — 85379 FIBRIN DEGRADATION QUANT: CPT

## 2020-12-14 PROCEDURE — 82728 ASSAY OF FERRITIN: CPT

## 2020-12-14 PROCEDURE — 85018 HEMOGLOBIN: CPT

## 2020-12-14 PROCEDURE — 80053 COMPREHEN METABOLIC PANEL: CPT

## 2020-12-14 PROCEDURE — 93005 ELECTROCARDIOGRAM TRACING: CPT

## 2020-12-14 PROCEDURE — 85014 HEMATOCRIT: CPT

## 2020-12-14 PROCEDURE — 94640 AIRWAY INHALATION TREATMENT: CPT

## 2020-12-14 PROCEDURE — 36600 WITHDRAWAL OF ARTERIAL BLOOD: CPT

## 2020-12-14 PROCEDURE — 99285 EMERGENCY DEPT VISIT HI MDM: CPT | Mod: 25

## 2020-12-14 PROCEDURE — 83605 ASSAY OF LACTIC ACID: CPT

## 2020-12-14 PROCEDURE — 82565 ASSAY OF CREATININE: CPT

## 2020-12-14 PROCEDURE — 82962 GLUCOSE BLOOD TEST: CPT

## 2020-12-14 PROCEDURE — 82330 ASSAY OF CALCIUM: CPT

## 2020-12-14 PROCEDURE — 82435 ASSAY OF BLOOD CHLORIDE: CPT

## 2020-12-14 PROCEDURE — 82803 BLOOD GASES ANY COMBINATION: CPT

## 2020-12-14 PROCEDURE — 85025 COMPLETE CBC W/AUTO DIFF WBC: CPT

## 2020-12-14 PROCEDURE — 84132 ASSAY OF SERUM POTASSIUM: CPT

## 2020-12-14 PROCEDURE — 71045 X-RAY EXAM CHEST 1 VIEW: CPT

## 2020-12-14 PROCEDURE — 93308 TTE F-UP OR LMTD: CPT

## 2020-12-14 PROCEDURE — 86140 C-REACTIVE PROTEIN: CPT

## 2020-12-14 PROCEDURE — 96374 THER/PROPH/DIAG INJ IV PUSH: CPT

## 2020-12-14 PROCEDURE — 80076 HEPATIC FUNCTION PANEL: CPT

## 2020-12-14 PROCEDURE — 83880 ASSAY OF NATRIURETIC PEPTIDE: CPT

## 2020-12-14 PROCEDURE — 83036 HEMOGLOBIN GLYCOSYLATED A1C: CPT

## 2020-12-14 PROCEDURE — 83735 ASSAY OF MAGNESIUM: CPT

## 2020-12-14 PROCEDURE — 96375 TX/PRO/DX INJ NEW DRUG ADDON: CPT

## 2020-12-14 PROCEDURE — 82947 ASSAY GLUCOSE BLOOD QUANT: CPT

## 2020-12-14 PROCEDURE — 83615 LACTATE (LD) (LDH) ENZYME: CPT

## 2020-12-14 PROCEDURE — 84145 PROCALCITONIN (PCT): CPT

## 2020-12-14 PROCEDURE — 96361 HYDRATE IV INFUSION ADD-ON: CPT

## 2020-12-14 RX ORDER — ALBUTEROL 90 UG/1
2 AEROSOL, METERED ORAL
Qty: 1 | Refills: 0
Start: 2020-12-14 | End: 2020-12-23

## 2020-12-14 RX ORDER — CLOPIDOGREL BISULFATE 75 MG/1
1 TABLET, FILM COATED ORAL
Qty: 0 | Refills: 0 | DISCHARGE
Start: 2020-12-14

## 2020-12-14 RX ORDER — CLOPIDOGREL BISULFATE 75 MG/1
1 TABLET, FILM COATED ORAL
Qty: 30 | Refills: 11

## 2020-12-14 RX ORDER — ASPIRIN/CALCIUM CARB/MAGNESIUM 324 MG
1 TABLET ORAL
Qty: 0 | Refills: 0 | DISCHARGE
Start: 2020-12-14

## 2020-12-14 RX ORDER — ACETAMINOPHEN 500 MG
2 TABLET ORAL
Qty: 0 | Refills: 0 | DISCHARGE
Start: 2020-12-14

## 2020-12-14 RX ORDER — ISOPROPYL ALCOHOL, BENZOCAINE .7; .06 ML/ML; ML/ML
1 SWAB TOPICAL
Qty: 100 | Refills: 1
Start: 2020-12-14 | End: 2021-02-01

## 2020-12-14 RX ORDER — ASPIRIN/CALCIUM CARB/MAGNESIUM 324 MG
1 TABLET ORAL
Qty: 30 | Refills: 11

## 2020-12-14 RX ADMIN — Medication 1 TABLET(S): at 13:13

## 2020-12-14 RX ADMIN — Medication 100 MILLIGRAM(S): at 13:16

## 2020-12-14 RX ADMIN — ALBUTEROL 2 PUFF(S): 90 AEROSOL, METERED ORAL at 13:16

## 2020-12-14 RX ADMIN — CLOPIDOGREL BISULFATE 75 MILLIGRAM(S): 75 TABLET, FILM COATED ORAL at 13:13

## 2020-12-14 RX ADMIN — Medication 81 MILLIGRAM(S): at 13:13

## 2020-12-14 RX ADMIN — Medication 7 UNIT(S): at 12:31

## 2020-12-14 RX ADMIN — ALBUTEROL 2 PUFF(S): 90 AEROSOL, METERED ORAL at 06:42

## 2020-12-14 RX ADMIN — Medication 7 UNIT(S): at 08:20

## 2020-12-14 RX ADMIN — Medication 4: at 12:31

## 2020-12-14 RX ADMIN — Medication 6 MILLIGRAM(S): at 06:41

## 2020-12-14 RX ADMIN — ALBUTEROL 2 PUFF(S): 90 AEROSOL, METERED ORAL at 00:00

## 2020-12-14 RX ADMIN — ENOXAPARIN SODIUM 40 MILLIGRAM(S): 100 INJECTION SUBCUTANEOUS at 06:41

## 2020-12-14 RX ADMIN — Medication 100 MILLIGRAM(S): at 06:41

## 2020-12-14 NOTE — PROVIDER CONTACT NOTE (OTHER) - SITUATION
Pt discharged, refusing to wait for MCOT device to be placed before discharge. Wife is downstairs to  patient.

## 2020-12-14 NOTE — DISCHARGE NOTE PROVIDER - NSDCCPCAREPLAN_GEN_ALL_CORE_FT
PRINCIPAL DISCHARGE DIAGNOSIS  Diagnosis: COVID-19  Assessment and Plan of Treatment: You tested positive for COVID 19.  You no longer require hospitalization.  Please restrict activities outside of your home except for getting medical care.  Do not go to work, school, or public areas.  Avoid using public transportation, ride-sharing, or taxis.  Separate yourself from other people and animals in your home.  Call ahead before visiting your doctor.  Wear a facemask when you are around other people. Cover your cough and sneezes.  Clean your hands often.  Avoid sharing personal household items.  Clean all frequently touched surfaces daily.      SECONDARY DISCHARGE DIAGNOSES  Diagnosis: Acute respiratory failure with hypoxia  Assessment and Plan of Treatment: Sats 94-95% RA at rest  Sats mid 80s on RA with ambulation  Remains with mild dry cough--discharge home on antitussives  Completed course of steroids & Remdesivir  Discharge home on supplemental O2 (2L as needed and at night)  Monitor pulse Ox at home, maintain o2 saturation >90%  F/u in Pulm office 2 weeks after discharge, (card was provided to you)  Will eventually need a repeat CT scan    Diagnosis: Type 2 diabetes mellitus not at goal  Assessment and Plan of Treatment: A1c 10.8, offered Endocrine consult on day of discharge but refused  Only on Metformin at home--understands he may need insulin since A1c >9.0  Prescribed glucometer to check fingerstick daily  Adhere to consistent carb diet  Follow-up with PCP in one week

## 2020-12-14 NOTE — PROGRESS NOTE ADULT - ASSESSMENT
49 yo M hx CAD s/p 1x stent, COVID+, c/o worsening SOB. Pt states he tested positive for COVID the day before Thanksgiving 10 days ago. This is also the time the pt's symptoms of myalgias, fatigue, cough, and fevers started, which have worsened over time and which is why he is here today. Pt has a pulse-oximeter at home. He notes his O2 sat to be typically 91-94%, and today was 85%. Pt took tylenol 2 houra menjivar.      Problem/Plan - 1:  ·  Problem: Acute respiratory failure with hypoxia.  Plan: Off HF NC oxygen and now on  NC Oxygen. Will need Home oxygen.  Pulmonary following.      Problem/Plan - 2:  ·  Problem:  Sepsis.  Plan: Present on admission. Resolved.     Likely sec to Viral infection. IVF ,Antiviral .      Problem/Plan - 3:  ·  Problem: Pneumonia.  Plan: Sec to COVID .      Problem/Plan - 4:  ·  Problem: COVID-19.  Plan: Started on Dexamethasone and Remidisvir . ID help appreciated.   Monitoring inflammatory markers and are trending down.      Problem/Plan - 5:  ·  Problem: Lactic acid acidosis.  Plan: IVF. Trending down.      Problem/Plan - 6:  Problem: CAD (coronary artery disease). Plan: S/P stent in oct 2019 . DAPT .     Problem/Plan - 7:  ·  Problem: Diabetes with Hyperglycemia .  Plan:On Steroid. SSI and increased  Lantus . Refused endo consult . Will check his blood sugars now and have PCP adjust the dose. said I was on Steroid and stuck in room so high sugars.  Sugars better now.      Problem/Plan - 8:  ·  Problem: HLD (hyperlipidemia).  Plan: Holding as on Remidisvir and watching LFT .      Problem/Plan - 9:  ·  Problem: Diarrhea.  Plan: Resolved.     Disposition ; DC planning home with Home Oxygen .

## 2020-12-14 NOTE — DISCHARGE NOTE PROVIDER - HOSPITAL COURSE
49 yo M hx CAD s/p 1x stent, COVID+, c/o worsening SOB and hypoxia. Pt states he tested positive for COVID the day before Thanksgiving. This is also the time the pt's symptoms of myalgias, fatigue, cough, and fevers started, which have worsened over time. During hospital course, pt's CXR with GGO, initially requiring high flow NC - now with improvement in hypoxia, on nasal cannula.  Patient s/p Remdesivir x 5 days (completed 12/9) & s/p Decadron x 10 days (completed 12/14). Patient with mild hypoxia in mid 80s during ambulation, meeting criteria for home O2 PRN & HS. Patient notably with A1c 10.8, was insulin during hospitalization, only on Metformin at home. Offered Endo consult on day of discharge for recommendations for likely need for new insulin use at home, however patient refusing. Patient prescribed glucometer & instructed on adhering to consistent carb diet. Patient agreed to follow-up with PCP in one week for further DM management, & Pulm-Dr. Hills in 2 weeks.

## 2020-12-14 NOTE — CHART NOTE - NSCHARTNOTEFT_GEN_A_CORE
SEBAS GARZA  50y Male  Patient is a 50y old  Male who presents with a chief complaint of sob and covid positive (14 Dec 2020 12:13)      Event Summary:  Patient with A1c 10.8, developed steroid-induced hyperglycemia requiring insulin during hospital course, was offered Endocrine consult upon discharge. Patient requesting to be discharged promptly, refusing Endocrine consult at this time despite understanding the need for likely insulin for A1c>9.     Patient agreed to glucometer to check FS daily at home. Also educated on consistent carb diet. Patient to follow-up with PCP in one week to further discuss optimizing DM therapy.     Nathaniel Saintus DNP, AGACNP, FNP-BC  #[phone]

## 2020-12-14 NOTE — PROVIDER CONTACT NOTE (OTHER) - ACTION/TREATMENT ORDERED:
NP Carlos aware, ok for patient to leave at this time without MCOT device.
NP orders to have respiratory therapist to increase oxygen levels and oxygen percentage till oxygen saturation is better.
YSABEL reece orders to RRT if patient desats to less than 88% while proning and to continue to prone patient.

## 2020-12-14 NOTE — PROGRESS NOTE ADULT - SUBJECTIVE AND OBJECTIVE BOX
INTERVAL HPI/OVERNIGHT EVENTS: I am all ready to go home today.   Vital Signs Last 24 Hrs  T(C): 36.8 (14 Dec 2020 12:03), Max: 36.9 (13 Dec 2020 21:00)  T(F): 98.2 (14 Dec 2020 12:03), Max: 98.4 (13 Dec 2020 21:00)  HR: 77 (14 Dec 2020 12:03) (67 - 77)  BP: 115/75 (14 Dec 2020 12:03) (104/60 - 117/71)  BP(mean): --  RR: 18 (14 Dec 2020 12:05) (18 - 18)  SpO2: 94% (14 Dec 2020 12:05) (94% - 97%)  I&O's Summary    13 Dec 2020 07:01  -  14 Dec 2020 07:00  --------------------------------------------------------  IN: 0 mL / OUT: 650 mL / NET: -650 mL    14 Dec 2020 07:01  -  14 Dec 2020 17:12  --------------------------------------------------------  IN: 140 mL / OUT: 225 mL / NET: -85 mL      MEDICATIONS  (STANDING):  ALBUTerol    90 MICROgram(s) HFA Inhaler 2 Puff(s) Inhalation every 6 hours  aspirin enteric coated 81 milliGRAM(s) Oral daily  benzonatate 100 milliGRAM(s) Oral three times a day  clopidogrel Tablet 75 milliGRAM(s) Oral daily  dextrose 40% Gel 15 Gram(s) Oral once  dextrose 5%. 1000 milliLiter(s) (50 mL/Hr) IV Continuous <Continuous>  dextrose 5%. 1000 milliLiter(s) (100 mL/Hr) IV Continuous <Continuous>  dextrose 50% Injectable 25 Gram(s) IV Push once  dextrose 50% Injectable 12.5 Gram(s) IV Push once  dextrose 50% Injectable 25 Gram(s) IV Push once  enoxaparin Injectable 40 milliGRAM(s) SubCutaneous every 12 hours  glucagon  Injectable 1 milliGRAM(s) IntraMuscular once  influenza   Vaccine 0.5 milliLiter(s) IntraMuscular once  insulin glargine Injectable (LANTUS) 35 Unit(s) SubCutaneous at bedtime  insulin lispro (ADMELOG) corrective regimen sliding scale   SubCutaneous three times a day before meals  insulin lispro (ADMELOG) corrective regimen sliding scale   SubCutaneous at bedtime  insulin lispro Injectable (ADMELOG) 7 Unit(s) SubCutaneous three times a day before meals  multivitamin 1 Tablet(s) Oral daily    MEDICATIONS  (PRN):  acetaminophen   Tablet .. 650 milliGRAM(s) Oral every 4 hours PRN Temp greater or equal to 38.5C (101.3F)    LABS:                        14.2   9.68  )-----------( 382      ( 14 Dec 2020 06:36 )             42.8     12-14    139  |  103  |  19  ----------------------------<  125<H>  4.0   |  22  |  0.80    Ca    9.3      14 Dec 2020 06:36    TPro  6.7  /  Alb  3.7  /  TBili  0.4  /  DBili  <0.1  /  AST  13  /  ALT  34  /  AlkPhos  45  12-14        CAPILLARY BLOOD GLUCOSE      POCT Blood Glucose.: 187 mg/dL (14 Dec 2020 11:50)  POCT Blood Glucose.: 141 mg/dL (14 Dec 2020 08:11)  POCT Blood Glucose.: 191 mg/dL (13 Dec 2020 21:49)          REVIEW OF SYSTEMS:  CONSTITUTIONAL: No fever, weight loss, or fatigue  EYES: No eye pain, visual disturbances, or discharge  ENMT:  No difficulty hearing, tinnitus, vertigo; No sinus or throat pain  NECK: No pain or stiffness  RESPIRATORY: No cough, wheezing, chills or hemoptysis; No shortness of breath  CARDIOVASCULAR: No chest pain, palpitations, dizziness, or leg swelling  GASTROINTESTINAL: No abdominal or epigastric pain. No nausea, vomiting, or hematemesis; No diarrhea or constipation. No melena or hematochezia.  GENITOURINARY: No dysuria, frequency, hematuria, or incontinence  NEUROLOGICAL: No headaches, memory loss, loss of strength, numbness, or tremors  SKIN: No itching, burning, rashes, or lesions   ENDOCRINE: No heat or cold intolerance; No hair loss      Consultant(s) Notes Reviewed:  [x ] YES  [ ] NO    PHYSICAL EXAM:  GENERAL: NAD, well-groomed, well-developed,not in any distress ,  HEAD:  Atraumatic, Normocephalic  EYES: EOMI, PERRLA, conjunctiva and sclera clear  ENMT: No tonsillar erythema, exudates, or enlargement; Moist mucous membranes, Good dentition, No lesions  NECK: Supple, No JVD, Normal thyroid  NERVOUS SYSTEM:  Alert & Oriented X3, No focal deficit   CHEST/LUNG: Good air entry bilateral with no  rales, rhonchi, wheezing, or rubs  HEART: Regular rate and rhythm; No murmurs, rubs, or gallops  ABDOMEN: Soft, Nontender, Nondistended; Bowel sounds present  EXTREMITIES:  2+ Peripheral Pulses, No clubbing, cyanosis, or edema  SKIN: No rashes or lesions    Care Discussed with Consultants/Other Providers [ x] YES  [ ] NO

## 2020-12-14 NOTE — DISCHARGE NOTE PROVIDER - CARE PROVIDER_API CALL
Flynn Hills  CRITICAL CARE MEDICINE  [address], [address]  [place] [address]  Phone: [phone]  Fax: [phone]  Follow Up Time: 2 weeks    Dr. Sliveira (PCP in [place]),   Phone: (   )    -  Fax: (   )    -  Established Patient  Follow Up Time: 1 week

## 2020-12-14 NOTE — PROGRESS NOTE ADULT - NUTRITIONAL ASSESSMENT
This patient has been assessed with a concern for Malnutrition and has been determined to have a diagnosis/diagnoses of Severe protein-calorie malnutrition.    This patient is being managed with:   Diet Consistent Carbohydrate w/Evening Snack-  Entered: Dec  5 2020  8:34PM    

## 2020-12-14 NOTE — PROGRESS NOTE ADULT - PROBLEM SELECTOR PLAN 1
+COVID PCR  -S/p Remdesivir x 5 days (completed 12/9)  -S/p Decadron x 10 days (completed 12/14)  -C/w tessalon perle 100mg q8h PRN x 5 days  -Trend inflammatory markers  -DVT ppx  -CXR with grossly unchanged bilateral patchy opacities. Will need f/u chest imaging as outpatient.

## 2020-12-14 NOTE — DISCHARGE NOTE NURSING/CASE MANAGEMENT/SOCIAL WORK - PATIENT PORTAL LINK FT
You can access the FollowMyHealth Patient Portal offered by [facility] by registering at the following website: [email or web address]. By joining [employer]’s FollowMyHealth portal, you will also be able to view your health information using other applications (apps) compatible with our system.

## 2020-12-14 NOTE — PROGRESS NOTE ADULT - SUBJECTIVE AND OBJECTIVE BOX
Follow-up Pulm Progress Note    Feeling good, mild ABEBE  Sats 94-95% RA at rest  Sats mid 80s on RA with ambulation  mild dry cough    Medications:  MEDICATIONS  (STANDING):  ALBUTerol    90 MICROgram(s) HFA Inhaler 2 Puff(s) Inhalation every 6 hours  aspirin enteric coated 81 milliGRAM(s) Oral daily  benzonatate 100 milliGRAM(s) Oral three times a day  clopidogrel Tablet 75 milliGRAM(s) Oral daily  dextrose 40% Gel 15 Gram(s) Oral once  dextrose 5%. 1000 milliLiter(s) (50 mL/Hr) IV Continuous <Continuous>  dextrose 5%. 1000 milliLiter(s) (100 mL/Hr) IV Continuous <Continuous>  dextrose 50% Injectable 25 Gram(s) IV Push once  dextrose 50% Injectable 12.5 Gram(s) IV Push once  dextrose 50% Injectable 25 Gram(s) IV Push once  enoxaparin Injectable 40 milliGRAM(s) SubCutaneous every 12 hours  glucagon  Injectable 1 milliGRAM(s) IntraMuscular once  influenza   Vaccine 0.5 milliLiter(s) IntraMuscular once  insulin glargine Injectable (LANTUS) 35 Unit(s) SubCutaneous at bedtime  insulin lispro (ADMELOG) corrective regimen sliding scale   SubCutaneous three times a day before meals  insulin lispro (ADMELOG) corrective regimen sliding scale   SubCutaneous at bedtime  insulin lispro Injectable (ADMELOG) 7 Unit(s) SubCutaneous three times a day before meals  multivitamin 1 Tablet(s) Oral daily    MEDICATIONS  (PRN):  acetaminophen   Tablet .. 650 milliGRAM(s) Oral every 4 hours PRN Temp greater or equal to 38.5C (101.3F)          Vital Signs Last 24 Hrs  T(C): 36.8 (14 Dec 2020 12:03), Max: 37.1 (13 Dec 2020 12:30)  T(F): 98.2 (14 Dec 2020 12:03), Max: 98.7 (13 Dec 2020 12:30)  HR: 77 (14 Dec 2020 12:03) (67 - 96)  BP: 115/75 (14 Dec 2020 12:03) (104/60 - 127/84)  BP(mean): --  RR: 18 (14 Dec 2020 12:05) (18 - 18)  SpO2: 94% (14 Dec 2020 12:05) (94% - 97%)          12-13 @ 07:01  -  12-14 @ 07:00  --------------------------------------------------------  IN: 0 mL / OUT: 650 mL / NET: -650 mL          LABS:                        14.2   9.68  )-----------( 382      ( 14 Dec 2020 06:36 )             42.8     12-14    139  |  103  |  19  ----------------------------<  125<H>  4.0   |  22  |  0.80    Ca    9.3      14 Dec 2020 06:36    TPro  6.7  /  Alb  3.7  /  TBili  0.4  /  DBili  <0.1  /  AST  13  /  ALT  34  /  AlkPhos  45  12-14          CAPILLARY BLOOD GLUCOSE      POCT Blood Glucose.: 187 mg/dL (14 Dec 2020 11:50)              Physical Examination:  PULM: decreased BS  CVS: RRR    RADIOLOGY REVIEWED  CXR 12/13: bilateral patchy opacities

## 2020-12-14 NOTE — DISCHARGE NOTE PROVIDER - NSDCMRMEDTOKEN_GEN_ALL_CORE_FT
acetaminophen 325 mg oral tablet: 2 tab(s) orally every 4 hours, As needed, Temp greater or equal to 38.5C (101.3F)  albuterol 90 mcg/inh inhalation aerosol: 2 puff(s) inhaled every 6 hours, As Needed for shortness of breath  alcohol swabs : Apply topically to affected area 4 times a day   aspirin 81 mg oral delayed release tablet: 1 tab(s) orally once a day  atorvastatin 20 mg oral tablet: 1 tab(s) orally once a day  benzonatate 100 mg oral capsule: 1 cap(s) orally every 8 hours x 5 days, As Needed for cough  clopidogrel 75 mg oral tablet: 1 tab(s) orally once a day  CoQ10: 1 cap(s) orally once a day  fenofibrate 160 mg oral tablet: 1 tab(s) orally once a day  Fish Oil 1200 mg oral capsule: 1 cap(s) orally once a day  glucometer (per patient&#x27;s insurance): Test blood sugars four times a day. Dispense #1 glucometer.  magnesium: 1 tab(s) orally once a day  metFORMIN 500 mg oral tablet: 1 tab(s) orally 2 times a day  Multiple Vitamins oral tablet: 1 tab(s) orally once a day  Vitamin D3 10,000 intl units (250 mcg) oral capsule: 1 cap(s) orally once a day  Zinc: 1 tab(s) orally once a day

## 2020-12-14 NOTE — PROGRESS NOTE ADULT - PROBLEM SELECTOR PLAN 2
2nd to COVID PNA  -Hypoxia much improved  -Ddimer <500  -D/c planning with home O2. Sats 94-95% RA at rest, mid 80s on RA with ambulation. Will need O2 2LNC with ambulation and qHS. Pt has pulse ox at home - d/w patient to keep O2 sat >90%.  -Will need to f/u in office 2 weeks after discharge, card given to pt, d/w pt in detail.

## 2020-12-14 NOTE — PROGRESS NOTE ADULT - REASON FOR ADMISSION
sob and covid positive

## 2020-12-14 NOTE — PROGRESS NOTE ADULT - SUBJECTIVE AND OBJECTIVE BOX
CC: F/U for COVID    Saw/spoke to patient. No fevers. No chills. Doing well. Improved. Still NC2.    Allergies  No Known Allergies    ANTIMICROBIALS:  Off    PE:    Vital Signs Last 24 Hrs  T(C): 36.8 (14 Dec 2020 12:03), Max: 36.9 (13 Dec 2020 21:00)  T(F): 98.2 (14 Dec 2020 12:03), Max: 98.4 (13 Dec 2020 21:00)  HR: 77 (14 Dec 2020 12:03) (67 - 77)  BP: 115/75 (14 Dec 2020 12:03) (104/60 - 117/71)  RR: 18 (14 Dec 2020 12:05) (18 - 18)  SpO2: 94% (14 Dec 2020 12:05) (94% - 97%)    Gen: AOx3, NAD, non-toxic  CV: S1+S2 normal, nontachycardic  Resp: Clear bilat, no resp distress, no crackles/wheezes  Abd: Soft, nontender, +BS  Ext: No LE edema, no wounds    LABS:                        14.2   9.68  )-----------( 382      ( 14 Dec 2020 06:36 )             42.8     12-14    139  |  103  |  19  ----------------------------<  125<H>  4.0   |  22  |  0.80    Ca    9.3      14 Dec 2020 06:36    TPro  6.7  /  Alb  3.7  /  TBili  0.4  /  DBili  <0.1  /  AST  13  /  ALT  34  /  AlkPhos  45  12-14    MICROBIOLOGY:    12/5 COVID+    RADIOLOGY:    12/13 XR:    Impression:    The heart is normal in size. Diffuse airspace opacity seen throughout both lung compatible with Covid 19 pneumonia. No change when compared to previous study done December 5, 2020.

## 2020-12-14 NOTE — PROGRESS NOTE ADULT - ASSESSMENT
49 yo M hx CAD s/p 1x stent, COVID+, c/o worsening SOB  Fever, no leukocytosis  COVID+  CXR consistent with COVID  Symptoms started around Thanksgiving  Hypoxic on 2LNC, improving  Overall,  1) COVID  - Symptoms started < 14 days ago, patient on high O2 supplementation  - S/p course Dexamethasone, agree with stopping  - S/p course remdisivir  - No signs secondary bacterial infection presently  2) SOB  - Any role for PE workup/anticoagulation per primary team  - O2 supplementation per primary team    Ethan Sanders MD  Pager [phone]  After 5pm and on weekends call [phone]

## 2020-12-14 NOTE — DISCHARGE NOTE PROVIDER - PROVIDER TOKENS
PROVIDER:[TOKEN:[[id number]],FOLLOWUP:[2 weeks]],FREE:[LAST:[Dr. Silveira (PCP in [place])],PHONE:[(   )    -],FAX:[(   )    -],FOLLOWUP:[1 week],ESTABLISHEDPATIENT:[T]]

## 2020-12-23 ENCOUNTER — TRANSCRIPTION ENCOUNTER (OUTPATIENT)
Age: 50
End: 2020-12-23

## 2020-12-28 ENCOUNTER — TRANSCRIPTION ENCOUNTER (OUTPATIENT)
Age: 50
End: 2020-12-28

## 2021-01-14 PROBLEM — E11.9 TYPE 2 DIABETES MELLITUS WITHOUT COMPLICATIONS: Chronic | Status: ACTIVE | Noted: 2020-12-06

## 2021-01-14 PROBLEM — Z00.00 ENCOUNTER FOR PREVENTIVE HEALTH EXAMINATION: Status: ACTIVE | Noted: 2021-01-14

## 2021-01-14 PROBLEM — I25.10 ATHEROSCLEROTIC HEART DISEASE OF NATIVE CORONARY ARTERY WITHOUT ANGINA PECTORIS: Chronic | Status: ACTIVE | Noted: 2020-12-05

## 2021-01-20 ENCOUNTER — APPOINTMENT (OUTPATIENT)
Dept: RADIOLOGY | Facility: CLINIC | Age: 51
End: 2021-01-20
Payer: COMMERCIAL

## 2021-01-20 ENCOUNTER — OUTPATIENT (OUTPATIENT)
Dept: OUTPATIENT SERVICES | Facility: HOSPITAL | Age: 51
LOS: 1 days | End: 2021-01-20
Payer: COMMERCIAL

## 2021-01-20 DIAGNOSIS — Z00.8 ENCOUNTER FOR OTHER GENERAL EXAMINATION: ICD-10-CM

## 2021-01-20 PROCEDURE — 71046 X-RAY EXAM CHEST 2 VIEWS: CPT

## 2021-01-20 PROCEDURE — 71046 X-RAY EXAM CHEST 2 VIEWS: CPT | Mod: 26

## 2021-03-10 NOTE — PROGRESS NOTE ADULT - SUBJECTIVE AND OBJECTIVE BOX
Problem: DISCHARGE PLANNING  Goal: Discharge to home or other facility with appropriate resources  Description: INTERVENTIONS:  - Identify barriers to discharge w/patient and caregiver  - Arrange for needed discharge resources and transportation as appropriate  - Identify discharge learning needs (meds, wound care, etc )  - Arrange for interpretive services to assist at discharge as needed  - Refer to Case Management Department for coordinating discharge planning if the patient needs post-hospital services based on physician/advanced practitioner order or complex needs related to functional status, cognitive ability, or social support system  Outcome: Progressing   PT is awaiting placement with assisted living, assessment completed this am with [facility], pending review  Follow-up Pulm Progress Note    Feeling better  High flow decreased to 50L/70% - sats maintaining 93%    Medications:  MEDICATIONS  (STANDING):  ALBUTerol    90 MICROgram(s) HFA Inhaler 2 Puff(s) Inhalation every 6 hours  aspirin enteric coated 81 milliGRAM(s) Oral daily  benzonatate 100 milliGRAM(s) Oral three times a day  clopidogrel Tablet 75 milliGRAM(s) Oral daily  dexAMETHasone  Injectable 6 milliGRAM(s) IV Push daily  dextrose 40% Gel 15 Gram(s) Oral once  dextrose 5%. 1000 milliLiter(s) (50 mL/Hr) IV Continuous <Continuous>  dextrose 5%. 1000 milliLiter(s) (100 mL/Hr) IV Continuous <Continuous>  dextrose 50% Injectable 25 Gram(s) IV Push once  dextrose 50% Injectable 12.5 Gram(s) IV Push once  dextrose 50% Injectable 25 Gram(s) IV Push once  enoxaparin Injectable 40 milliGRAM(s) SubCutaneous every 12 hours  glucagon  Injectable 1 milliGRAM(s) IntraMuscular once  influenza   Vaccine 0.5 milliLiter(s) IntraMuscular once  insulin glargine Injectable (LANTUS) 35 Unit(s) SubCutaneous at bedtime  insulin lispro (ADMELOG) corrective regimen sliding scale   SubCutaneous three times a day before meals  insulin lispro (ADMELOG) corrective regimen sliding scale   SubCutaneous at bedtime  remdesivir  IVPB 100 milliGRAM(s) IV Intermittent every 24 hours  remdesivir  IVPB   IV Intermittent     MEDICATIONS  (PRN):  acetaminophen   Tablet .. 650 milliGRAM(s) Oral every 4 hours PRN Temp greater or equal to 38.5C (101.3F)          Vital Signs Last 24 Hrs  T(C): 36.6 (09 Dec 2020 12:33), Max: 37.1 (09 Dec 2020 08:00)  T(F): 97.9 (09 Dec 2020 12:33), Max: 98.7 (09 Dec 2020 08:00)  HR: 70 (09 Dec 2020 12:33) (60 - 85)  BP: 122/71 (09 Dec 2020 12:33) (121/74 - 135/79)  BP(mean): --  RR: 18 (09 Dec 2020 12:33) (18 - 24)  SpO2: 98% (09 Dec 2020 12:33) (93% - 99%)    ABG - ( 07 Dec 2020 15:46 )  pH, Arterial: 7.45  pH, Blood: x     /  pCO2: 35    /  pO2: 77    / HCO3: 24    / Base Excess: .6    /  SaO2: 95                    12-08 @ 07:01  -  12-09 @ 07:00  --------------------------------------------------------  IN: 830 mL / OUT: 2200 mL / NET: -1370 mL          LABS:                        13.3   9.68  )-----------( 416      ( 09 Dec 2020 05:39 )             40.6     12-09    142  |  102  |  23  ----------------------------<  140<H>  4.2   |  26  |  0.81    Ca    9.8      09 Dec 2020 05:39    TPro  6.7  /  Alb  3.3  /  TBili  0.4  /  DBili  x   /  AST  34  /  ALT  57<H>  /  AlkPhos  57  12-09          CAPILLARY BLOOD GLUCOSE      POCT Blood Glucose.: 346 mg/dL (09 Dec 2020 12:11)        Procalcitonin, Serum: 0.38 ng/mL (12-07-20 @ 05:41)    Serum Pro-Brain Natriuretic Peptide: 268 pg/mL (12-07-20 @ 05:41)        Physical Examination:  PULM: decreased BS  CVS: RRR    RADIOLOGY REVIEWED  CXR: bilateral patchy opacities

## 2021-06-11 NOTE — ED ADULT TRIAGE NOTE - MEANS OF ARRIVAL
Thank you for choosing Dr. Ribeiro at [facility]. It's a pleasure to be a part of your healthcare team. Please let us know if you need anything---[phone] for the nursing staff.     You may receive a short survey from [facility] about this visit. We appreciate your feedback.     -Elsy MULLINS & Emma CHUA    
ambulatory

## 2021-07-18 NOTE — ED ADULT NURSE NOTE - NSIMPLEMENTINTERV_GEN_ALL_ED
Home
Implemented All Universal Safety Interventions:  [place] to call system. Call bell, personal items and telephone within reach. Instruct patient to call for assistance. Room bathroom lighting operational. Non-slip footwear when patient is off stretcher. Physically safe environment: no spills, clutter or unnecessary equipment. Stretcher in lowest position, wheels locked, appropriate side rails in place.

## 2022-05-05 ENCOUNTER — RESULT REVIEW (OUTPATIENT)
Age: 52
End: 2022-05-05

## 2022-05-05 ENCOUNTER — OUTPATIENT (OUTPATIENT)
Dept: OUTPATIENT SERVICES | Facility: HOSPITAL | Age: 52
LOS: 1 days | Discharge: HOME | End: 2022-05-05
Payer: COMMERCIAL

## 2022-05-05 VITALS
SYSTOLIC BLOOD PRESSURE: 126 MMHG | HEART RATE: 84 BPM | OXYGEN SATURATION: 97 % | HEIGHT: 71 IN | TEMPERATURE: 97 F | DIASTOLIC BLOOD PRESSURE: 81 MMHG | RESPIRATION RATE: 11 BRPM | WEIGHT: 259.93 LBS

## 2022-05-05 DIAGNOSIS — Z12.11 ENCOUNTER FOR SCREENING FOR MALIGNANT NEOPLASM OF COLON: ICD-10-CM

## 2022-05-05 DIAGNOSIS — Z95.5 PRESENCE OF CORONARY ANGIOPLASTY IMPLANT AND GRAFT: Chronic | ICD-10-CM

## 2022-05-05 PROCEDURE — 88305 TISSUE EXAM BY PATHOLOGIST: CPT | Mod: 26

## 2022-05-05 PROCEDURE — 88305 TISSUE EXAM BY PATHOLOGIST: CPT

## 2022-05-05 RX ORDER — UBIDECARENONE 100 MG
1 CAPSULE ORAL
Qty: 0 | Refills: 0 | DISCHARGE

## 2022-05-05 RX ORDER — DAPAGLIFLOZIN 10 MG/1
1 TABLET, FILM COATED ORAL
Qty: 0 | Refills: 0 | DISCHARGE

## 2022-05-05 RX ORDER — CHOLECALCIFEROL (VITAMIN D3) 125 MCG
1 CAPSULE ORAL
Qty: 0 | Refills: 0 | DISCHARGE

## 2022-05-05 RX ORDER — OMEGA-3 ACID ETHYL ESTERS 1 G
1 CAPSULE ORAL
Qty: 0 | Refills: 0 | DISCHARGE

## 2022-05-05 RX ORDER — FENOFIBRATE,MICRONIZED 130 MG
1 CAPSULE ORAL
Qty: 0 | Refills: 0 | DISCHARGE

## 2022-05-05 RX ORDER — L.ACIDOPH/B.ANIMALIS/B.LONGUM 15B CELL
1 CAPSULE ORAL
Qty: 0 | Refills: 0 | DISCHARGE

## 2022-05-05 RX ORDER — ZINC SULFATE TAB 220 MG (50 MG ZINC EQUIVALENT) 220 (50 ZN) MG
1 TAB ORAL
Qty: 0 | Refills: 0 | DISCHARGE

## 2022-05-05 RX ORDER — METFORMIN HYDROCHLORIDE 850 MG/1
1 TABLET ORAL
Qty: 0 | Refills: 0 | DISCHARGE

## 2022-05-05 RX ORDER — ATORVASTATIN CALCIUM 80 MG/1
1 TABLET, FILM COATED ORAL
Qty: 0 | Refills: 0 | DISCHARGE

## 2022-05-05 NOTE — ASU PATIENT PROFILE, ADULT - BRADEN SCORE (IF 18 OR LESS ACTIVATE SKIN INJURY RISK INCREASED GUIDELINE), MLM
[facility] Delivery Note


Delivery Note


DATE OF DELIVERY: 2021 





PREDELIVERY DIAGNOSIS: 39-3/7 weeks' gestation and labor. 





POST DELIVERY DIAGNOSIS: Delivered.





PROCEDURE: Spontaneous vaginal delivery.





OBSTETRICIAN: Rosaura Smart CNM





ANESTHESIA: none





ESTIMATED BLOOD LOSS: 550 mL.





FINDINGS: 8 pound 6 ounce female infant Marisol, Apgar Score [8/9, no nuchal 

cord.





DELIVERY SUMMARY: Patient is a 25-year-old  1 now para 0 who was admitted

 to labor and delivery for active labor on 2021.  Bella called out with

 c/o urge to push.  Gradual progress was made to  on OA presentation with 

restitution to SHILPI.  The left anterior shoulder delivered easily followed by the

 posterior shoulder and corpus.  The infant was placed immediately on the 

maternal abdomen where she was dried and stimulated.  The cord was clamped x2 

after pulsation ceased and cut by the FOB.  Cord blood was collected and sent 

for testing.  The IV was noted to be out and the patient given 10u IM pitocin in

 the right thigh. The placenta delivered spontaneously intact with a 

succenturiate lobe in [place] presentation with large bleeding.  The uterus was 

massaged and the cervix swept for several clots and a small portion of amniotic 

membrane.  1000mcg cytotec was placed rectally and the cervix was swept for 

several additional clots and the uterus massaged and methergine given IM in the 

right thigh.  The IV was restarted in the left arm and pitocin infusion started 

per protocol.  The uterus firmed and bleeding slowed with interventions.  On 

examination of the perineum a hemostatic right labial abrasion and perineal 

abrasion were noted which did not require repair.  Mother and baby entered the 

recovery phase in stable condition.











ROSAURA SMART CNM       2021 18:41
21

## 2022-05-05 NOTE — ASU PATIENT PROFILE, ADULT - NSICDXPASTMEDICALHX_GEN_ALL_CORE_FT
PAST MEDICAL HISTORY:  CAD (coronary artery disease)     JUVENTINO (obstructive sleep apnea)     Type 2 diabetes mellitus without complication, without long-term current use of insulin

## 2022-05-05 NOTE — ASU PATIENT PROFILE, ADULT - FALL HARM RISK - UNIVERSAL INTERVENTIONS
Bed in lowest position, wheels locked, appropriate side rails in place/Call bell, personal items and telephone in reach/Instruct patient to call for assistance before getting out of bed or chair/Non-slip footwear when patient is out of bed/[place] to call system/Physically safe environment - no spills, clutter or unnecessary equipment/Purposeful Proactive Rounding/Room/bathroom lighting operational, light cord in reach

## 2022-05-11 DIAGNOSIS — G47.33 OBSTRUCTIVE SLEEP APNEA (ADULT) (PEDIATRIC): ICD-10-CM

## 2022-05-11 DIAGNOSIS — Z79.84 LONG TERM (CURRENT) USE OF ORAL HYPOGLYCEMIC DRUGS: ICD-10-CM

## 2022-05-11 DIAGNOSIS — Z12.11 ENCOUNTER FOR SCREENING FOR MALIGNANT NEOPLASM OF COLON: ICD-10-CM

## 2022-05-11 DIAGNOSIS — D12.3 BENIGN NEOPLASM OF TRANSVERSE COLON: ICD-10-CM

## 2022-05-11 DIAGNOSIS — I25.10 ATHEROSCLEROTIC HEART DISEASE OF NATIVE CORONARY ARTERY WITHOUT ANGINA PECTORIS: ICD-10-CM

## 2022-05-11 DIAGNOSIS — D12.8 BENIGN NEOPLASM OF RECTUM: ICD-10-CM

## 2022-05-11 DIAGNOSIS — Z95.5 PRESENCE OF CORONARY ANGIOPLASTY IMPLANT AND GRAFT: ICD-10-CM

## 2022-05-11 DIAGNOSIS — E11.9 TYPE 2 DIABETES MELLITUS WITHOUT COMPLICATIONS: ICD-10-CM

## 2022-05-11 DIAGNOSIS — Z83.71 FAMILY HISTORY OF COLONIC POLYPS: ICD-10-CM

## 2022-05-11 DIAGNOSIS — Z79.02 LONG TERM (CURRENT) USE OF ANTITHROMBOTICS/ANTIPLATELETS: ICD-10-CM

## 2022-05-11 DIAGNOSIS — Z88.0 ALLERGY STATUS TO PENICILLIN: ICD-10-CM

## 2022-05-11 DIAGNOSIS — E78.5 HYPERLIPIDEMIA, UNSPECIFIED: ICD-10-CM

## 2022-05-11 DIAGNOSIS — Z79.82 LONG TERM (CURRENT) USE OF ASPIRIN: ICD-10-CM

## 2022-08-15 PROBLEM — G47.33 OBSTRUCTIVE SLEEP APNEA (ADULT) (PEDIATRIC): Chronic | Status: ACTIVE | Noted: 2022-05-05

## 2022-08-24 ENCOUNTER — APPOINTMENT (OUTPATIENT)
Dept: ORTHOPEDIC SURGERY | Facility: CLINIC | Age: 52
End: 2022-08-24

## 2022-08-24 ENCOUNTER — NON-APPOINTMENT (OUTPATIENT)
Age: 52
End: 2022-08-24

## 2022-08-24 DIAGNOSIS — M72.2 PLANTAR FASCIAL FIBROMATOSIS: ICD-10-CM

## 2022-08-24 PROCEDURE — 99204 OFFICE O/P NEW MOD 45 MIN: CPT

## 2022-08-24 PROCEDURE — 73650 X-RAY EXAM OF HEEL: CPT | Mod: 59,LT

## 2022-08-24 PROCEDURE — 73630 X-RAY EXAM OF FOOT: CPT | Mod: LT

## 2022-08-24 NOTE — DISCUSSION/SUMMARY
[de-identified] : Assessment: Left foot Plantar Fasciitis\par \par Plan:\par #1. Anti-inflammatories/Tylenol as needed for pain. I recommend that the patient utilize Voltaren gel topically. If the Voltaren gel could not be obtained, Icy Hot, Biofreeze, or Bengay can be utilized instead.		\par #2. Home stretching program/physical therapy prescription given.\par #3. Dr. Meeks's insoles/ gel heel cups.\par #4. Epsom Salt Baths daily\par #5. Dorsal Night Splint. Use as instructed/as directed.\par #6. I advised the patient to utilize a frozen water bottle or golf ball as a foot roller/massager.		\par #7. All Questions answered. The patient understood the treatment plan above. Follow up in 2-3 months PRN for re-evaluation.\par

## 2022-08-24 NOTE — HISTORY OF PRESENT ILLNESS
[FreeTextEntry1] : The patient is a 51 year old male presenting for an initial evaluation of left foot pain x 3 weeks. Pain is localized to the plantar surface of his left foot localized to the heel. Pain is at worst in the morning and with long car rides as per the patient. The patient cannot attribute their pain to any injury, fall, or trauma. He presents wearing sneakers and is walking without assistance. History of Achilles tendinitis. No other complaints.

## 2022-08-24 NOTE — PHYSICAL EXAM
[de-identified] : General: Alert and oriented x3. In no acute distress. Pleasant in nature with a normal affect. No apparent respiratory distress.\par \par Left Foot Exam\par Skin: Clean, dry, intact\par Inspection: No obvious malalignment, no masses, no swelling, no effusion\par Pulses: 2+ DP/PT pulses\par ROM: FOOT Full  ROM of digits, ANKLE 10 degrees of dorsiflexion, 40 degrees of plantarflexion, 10 degrees of subtalar motion.\par Painful ROM: None\par Tenderness: Plantar medial pain. Plantar central pain. No tenderness over the medial malleolus, No tenderness over the lateral malleolus, no CFL/ATFL/PTFL pain, no deltoid ligament pain. No heel pain. No Achilles tenderness. No 5th metatarsal pain. No pain to the LisFranc joint. No ttp over the posterior tibial tendon.\par Stability: Negative anterior/posterior drawer.\par Strength: 5/5 ADD/ABD/TA/GS/EHL/FHL/EDL\par Neuro: Sensation in tact to light touch throughout\par Additional tests: Negative Mortons test, negative tarsal tunnel tinels, negative single heel rise.	 [de-identified] : 3V of the left foot were ordered, obtained, and reviewed by me today, 08/24/2022, revealed: Posterior spurring. Calcaneal spurring. No acute fractures. \par \par X-rays of the left calcaneus were ordered, obtained, and reviewed by me today, 08/24/2022, revealed: Posterior spurring. Calcaneal spurring. No acute fractures.
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
## 2022-10-18 NOTE — CHART NOTE - NSCHARTNOTEFT_GEN_A_CORE
Patient presents with a diagnosis of Hypoxic respiratory failure secondary to COVID 19. Acute exacerbation is currently resolved and patient will require 2 liters of continuous oxygen at home for CHF. Room air at rest oxygen saturation is 93% - Room air oxygen saturation on ambulation is 84%; oxygen saturation on 2 liters on ambulation is 91%.    Contact # [id number] Rhombic Flap Text: The defect edges were debeveled with a #15 scalpel blade.  Given the location of the defect and the proximity to free margins a rhombic flap was deemed most appropriate.  Using a sterile surgical marker, an appropriate rhombic flap was drawn incorporating the defect.    The area thus outlined was incised deep to adipose tissue with a #15 scalpel blade.  The skin margins were undermined to an appropriate distance in all directions utilizing iris scissors.

## 2023-05-24 ENCOUNTER — OFFICE (OUTPATIENT)
Dept: URBAN - METROPOLITAN AREA CLINIC 102 | Facility: CLINIC | Age: 53
Setting detail: OPHTHALMOLOGY
End: 2023-05-24
Payer: COMMERCIAL

## 2023-05-24 DIAGNOSIS — H16.223: ICD-10-CM

## 2023-05-24 DIAGNOSIS — H11.153: ICD-10-CM

## 2023-05-24 DIAGNOSIS — E11.9: ICD-10-CM

## 2023-05-24 DIAGNOSIS — H40.013: ICD-10-CM

## 2023-05-24 PROCEDURE — 92004 COMPRE OPH EXAM NEW PT 1/>: CPT | Performed by: OPHTHALMOLOGY

## 2023-05-24 PROCEDURE — 92083 EXTENDED VISUAL FIELD XM: CPT | Performed by: OPHTHALMOLOGY

## 2023-05-24 PROCEDURE — 92020 GONIOSCOPY: CPT | Performed by: OPHTHALMOLOGY

## 2023-05-24 PROCEDURE — 92133 CPTRZD OPH DX IMG PST SGM ON: CPT | Performed by: OPHTHALMOLOGY

## 2023-05-24 PROCEDURE — 76514 ECHO EXAM OF EYE THICKNESS: CPT | Performed by: OPHTHALMOLOGY

## 2023-05-24 ASSESSMENT — REFRACTION_CURRENTRX
OS_SPHERE: -1.50
OD_SPHERE: -1.50
OS_OVR_VA: 20/
OD_OVR_VA: 20/

## 2023-05-24 ASSESSMENT — AXIALLENGTH_DERIVED
OD_AL: 24.3858
OS_AL: 24.2425
OS_AL: 24.294
OD_AL: 24.4903

## 2023-05-24 ASSESSMENT — SPHEQUIV_DERIVED
OS_SPHEQUIV: -1.75
OD_SPHEQUIV: -1.5
OS_SPHEQUIV: -1.625
OD_SPHEQUIV: -1.75

## 2023-05-24 ASSESSMENT — KERATOMETRY
OS_K2POWER_DIOPTERS: 43.50
METHOD_AUTO_MANUAL: AUTO
OS_K1POWER_DIOPTERS: 43.50
OD_AXISANGLE_DEGREES: 090
OS_AXISANGLE_DEGREES: 090
OD_K1POWER_DIOPTERS: 43.00
OD_K2POWER_DIOPTERS: 43.00

## 2023-05-24 ASSESSMENT — REFRACTION_AUTOREFRACTION
OS_CYLINDER: -0.25
OS_AXIS: 055
OS_SPHERE: -1.50
OD_SPHERE: -1.50
OD_CYLINDER: -0.50
OD_AXIS: 105

## 2023-05-24 ASSESSMENT — TEAR BREAK UP TIME (TBUT)
OS_TBUT: T
OD_TBUT: 1+

## 2023-05-24 ASSESSMENT — REFRACTION_MANIFEST
OS_VA2: 20/20
OD_ADD: +1.25
OD_AXIS: 105
OD_VA1: 20/20
OS_AXIS: 45
OD_VA2: 20/20
OS_SPHERE: -1.50
OS_SPHERE: -1.75
OD_SPHERE: -1.25
OS_VA1: 20/20
OS_CYLINDER: -0.50
OD_CYLINDER: -0.50
OS_ADD: +1.25
OD_SPHERE: -1.50

## 2023-05-24 ASSESSMENT — PACHYMETRY
OD_CT_CORRECTION: -4
OS_CT_CORRECTION: -4
OS_CT_UM: 591
OD_CT_UM: 595

## 2023-05-24 ASSESSMENT — VISUAL ACUITY
OD_BCVA: 20/20
OS_BCVA: 20/20-1

## 2023-05-24 ASSESSMENT — TONOMETRY
OD_IOP_MMHG: 18
OS_IOP_MMHG: 18
OD_IOP_MMHG: 20

## 2023-05-24 ASSESSMENT — CONFRONTATIONAL VISUAL FIELD TEST (CVF)
OD_FINDINGS: FULL
OS_FINDINGS: FULL

## 2023-07-26 ENCOUNTER — OFFICE (OUTPATIENT)
Dept: URBAN - METROPOLITAN AREA CLINIC 102 | Facility: CLINIC | Age: 53
Setting detail: OPHTHALMOLOGY
End: 2023-07-26
Payer: COMMERCIAL

## 2023-07-26 DIAGNOSIS — H11.153: ICD-10-CM

## 2023-07-26 DIAGNOSIS — E11.9: ICD-10-CM

## 2023-07-26 DIAGNOSIS — H16.223: ICD-10-CM

## 2023-07-26 DIAGNOSIS — H25.13: ICD-10-CM

## 2023-07-26 DIAGNOSIS — H40.013: ICD-10-CM

## 2023-07-26 PROCEDURE — 92250 FUNDUS PHOTOGRAPHY W/I&R: CPT | Performed by: OPHTHALMOLOGY

## 2023-07-26 PROCEDURE — 92083 EXTENDED VISUAL FIELD XM: CPT | Performed by: OPHTHALMOLOGY

## 2023-07-26 PROCEDURE — 92012 INTRM OPH EXAM EST PATIENT: CPT | Performed by: OPHTHALMOLOGY

## 2023-07-26 ASSESSMENT — REFRACTION_MANIFEST
OS_AXIS: 45
OD_CYLINDER: -0.50
OD_VA2: 20/20
OS_VA1: 20/20
OS_VA2: 20/20
OS_CYLINDER: -0.50
OD_SPHERE: -1.25
OS_SPHERE: -1.75
OS_ADD: +1.25
OD_SPHERE: -1.50
OD_AXIS: 105
OS_SPHERE: -1.50
OD_VA1: 20/20
OD_ADD: +1.25

## 2023-07-26 ASSESSMENT — KERATOMETRY
METHOD_AUTO_MANUAL: AUTO
OD_AXISANGLE_DEGREES: 055
OS_K1POWER_DIOPTERS: 43.25
OS_AXISANGLE_DEGREES: 090
OD_K1POWER_DIOPTERS: 42.75
OD_K2POWER_DIOPTERS: 43.00
OS_K2POWER_DIOPTERS: 43.25

## 2023-07-26 ASSESSMENT — PACHYMETRY
OS_CT_UM: 591
OD_CT_UM: 595
OS_CT_CORRECTION: -4
OD_CT_CORRECTION: -4

## 2023-07-26 ASSESSMENT — REFRACTION_CURRENTRX
OS_SPHERE: -1.50
OD_SPHERE: -1.50
OS_OVR_VA: 20/
OD_OVR_VA: 20/

## 2023-07-26 ASSESSMENT — CONFRONTATIONAL VISUAL FIELD TEST (CVF)
OD_FINDINGS: FULL
OS_FINDINGS: FULL

## 2023-07-26 ASSESSMENT — AXIALLENGTH_DERIVED
OD_AL: 24.435
OS_AL: 24.3918
OS_AL: 24.3398
OD_AL: 24.5399

## 2023-07-26 ASSESSMENT — VISUAL ACUITY
OS_BCVA: 20/150
OD_BCVA: 20/100-1

## 2023-07-26 ASSESSMENT — REFRACTION_AUTOREFRACTION
OD_SPHERE: -1.50
OD_AXIS: 111
OS_AXIS: 082
OD_CYLINDER: -0.50
OS_CYLINDER: -0.25
OS_SPHERE: -1.50

## 2023-07-26 ASSESSMENT — SPHEQUIV_DERIVED
OD_SPHEQUIV: -1.5
OS_SPHEQUIV: -1.625
OS_SPHEQUIV: -1.75
OD_SPHEQUIV: -1.75

## 2023-07-26 ASSESSMENT — TONOMETRY
OD_IOP_MMHG: 18
OS_IOP_MMHG: 20

## 2023-07-26 ASSESSMENT — TEAR BREAK UP TIME (TBUT)
OS_TBUT: T
OD_TBUT: 1+

## 2023-10-11 NOTE — ED PROVIDER NOTE - TEMPLATE, MLM
24h Holter   ----- Message from Moises Atkinson Cardiac

## 2023-12-11 ENCOUNTER — APPOINTMENT (OUTPATIENT)
Age: 53
End: 2023-12-11
Payer: COMMERCIAL

## 2023-12-11 PROCEDURE — 99204 OFFICE O/P NEW MOD 45 MIN: CPT

## 2024-06-12 ENCOUNTER — APPOINTMENT (OUTPATIENT)
Age: 54
End: 2024-06-12

## 2025-03-20 NOTE — H&P ADULT - PROBLEM SELECTOR PROBLEM 2
Date/Time: 03/20/25 1000    Scheduled providers: Romie Garcia M.D.; Deepali Sanchez M.D.    Procedure: CL-CARDIOVERSION    Diagnosis:       Cardiac pacemaker in situ [Z95.0]      AV block, complete (HCC) [I44.2]      Paroxysmal atrial fibrillation (HCC) [I48.0]      Chronic anticoagulation [Z79.01]    Indications: See Associated Dx    Location: [facility] Echocard[facility]            Relevant Problems   CARDIAC   (positive) AV block, complete (HCC)   (positive) Cardiac pacemaker in situ   (positive) Hypertension   (positive) Paroxysmal atrial fibrillation (HCC)   (positive) Rheumatic aortic stenosis       Physical Exam    Airway   Mallampati: I  TM distance: >3 FB  Neck ROM: full       Cardiovascular - normal exam     Dental - normal exam           Pulmonary - normal exam     Abdominal    Neurological                Anesthesia Plan    ASA 2       Plan - MAC         (80yo male presenting for cardioversion in the setting of afib. Appropriately NPO. Allergies reviewed. PMH: HTN, AV block s/p pacer.)      Induction: intravenous      Pertinent diagnostic labs and testing reviewed    Informed Consent:    Anesthetic plan and risks discussed with patient.           R/O Sepsis

## 2025-08-25 RX ORDER — TIRZEPATIDE 7.5 MG/.5ML
15 INJECTION, SOLUTION SUBCUTANEOUS
Refills: 0 | DISCHARGE
Start: 2025-08-25

## 2025-09-09 ENCOUNTER — OUTPATIENT (OUTPATIENT)
Dept: OUTPATIENT SERVICES | Facility: HOSPITAL | Age: 55
LOS: 1 days | Discharge: HOME | End: 2025-09-09
Payer: COMMERCIAL

## 2025-09-09 VITALS
TEMPERATURE: 97 F | WEIGHT: 248.02 LBS | DIASTOLIC BLOOD PRESSURE: 82 MMHG | SYSTOLIC BLOOD PRESSURE: 171 MMHG | HEART RATE: 71 BPM | OXYGEN SATURATION: 97 % | RESPIRATION RATE: 16 BRPM | HEIGHT: 71 IN

## 2025-09-09 DIAGNOSIS — Z95.5 PRESENCE OF CORONARY ANGIOPLASTY IMPLANT AND GRAFT: Chronic | ICD-10-CM

## 2025-09-09 DIAGNOSIS — Z86.0100 PERSONAL HISTORY OF COLON POLYPS, UNSPECIFIED: ICD-10-CM

## 2025-09-09 PROCEDURE — 88305 TISSUE EXAM BY PATHOLOGIST: CPT | Mod: 26

## 2025-09-09 PROCEDURE — 88305 TISSUE EXAM BY PATHOLOGIST: CPT

## 2025-09-10 LAB — SURGICAL PATHOLOGY STUDY: SIGNIFICANT CHANGE UP

## 2025-09-11 DIAGNOSIS — Z83.719 FAMILY HISTORY OF COLON POLYPS, UNSPECIFIED: ICD-10-CM

## 2025-09-11 DIAGNOSIS — Z88.0 ALLERGY STATUS TO PENICILLIN: ICD-10-CM

## 2025-09-11 DIAGNOSIS — Z12.11 ENCOUNTER FOR SCREENING FOR MALIGNANT NEOPLASM OF COLON: ICD-10-CM

## 2025-09-11 DIAGNOSIS — I25.10 ATHEROSCLEROTIC HEART DISEASE OF NATIVE CORONARY ARTERY WITHOUT ANGINA PECTORIS: ICD-10-CM

## 2025-09-11 DIAGNOSIS — G47.33 OBSTRUCTIVE SLEEP APNEA (ADULT) (PEDIATRIC): ICD-10-CM

## 2025-09-11 DIAGNOSIS — K63.5 POLYP OF COLON: ICD-10-CM

## 2025-09-11 DIAGNOSIS — Z88.1 ALLERGY STATUS TO OTHER ANTIBIOTIC AGENTS: ICD-10-CM

## 2025-09-11 DIAGNOSIS — Z86.0100 PERSONAL HISTORY OF COLON POLYPS, UNSPECIFIED: ICD-10-CM

## 2025-09-11 DIAGNOSIS — Z79.85 LONG-TERM (CURRENT) USE OF INJECTABLE NON-INSULIN ANTIDIABETIC DRUGS: ICD-10-CM

## 2025-09-11 DIAGNOSIS — Z95.5 PRESENCE OF CORONARY ANGIOPLASTY IMPLANT AND GRAFT: ICD-10-CM

## 2025-09-11 DIAGNOSIS — Z79.84 LONG TERM (CURRENT) USE OF ORAL HYPOGLYCEMIC DRUGS: ICD-10-CM

## 2025-09-11 DIAGNOSIS — E78.5 HYPERLIPIDEMIA, UNSPECIFIED: ICD-10-CM

## 2025-09-11 DIAGNOSIS — E11.9 TYPE 2 DIABETES MELLITUS WITHOUT COMPLICATIONS: ICD-10-CM

## 2025-09-11 DIAGNOSIS — Z79.82 LONG TERM (CURRENT) USE OF ASPIRIN: ICD-10-CM

## 2025-09-11 DIAGNOSIS — Z79.02 LONG TERM (CURRENT) USE OF ANTITHROMBOTICS/ANTIPLATELETS: ICD-10-CM
